# Patient Record
Sex: FEMALE | Race: WHITE | NOT HISPANIC OR LATINO | Employment: OTHER | URBAN - NONMETROPOLITAN AREA
[De-identification: names, ages, dates, MRNs, and addresses within clinical notes are randomized per-mention and may not be internally consistent; named-entity substitution may affect disease eponyms.]

---

## 2017-01-03 DIAGNOSIS — E78.49 OTHER HYPERLIPIDEMIA: Primary | ICD-10-CM

## 2017-01-03 DIAGNOSIS — E03.8 OTHER SPECIFIED HYPOTHYROIDISM: ICD-10-CM

## 2017-01-05 ENCOUNTER — OFFICE VISIT (OUTPATIENT)
Dept: CARDIOLOGY | Facility: CLINIC | Age: 72
End: 2017-01-05

## 2017-01-05 VITALS
HEART RATE: 72 BPM | HEIGHT: 62 IN | BODY MASS INDEX: 29.81 KG/M2 | SYSTOLIC BLOOD PRESSURE: 132 MMHG | WEIGHT: 162 LBS | DIASTOLIC BLOOD PRESSURE: 88 MMHG

## 2017-01-05 DIAGNOSIS — I10 ESSENTIAL HYPERTENSION: ICD-10-CM

## 2017-01-05 DIAGNOSIS — I25.9 ISCHEMIC HEART DISEASE: Primary | ICD-10-CM

## 2017-01-05 DIAGNOSIS — F17.210 CIGARETTE SMOKER: ICD-10-CM

## 2017-01-05 DIAGNOSIS — I34.0 NON-RHEUMATIC MITRAL REGURGITATION: ICD-10-CM

## 2017-01-05 DIAGNOSIS — E78.00 PURE HYPERCHOLESTEROLEMIA: ICD-10-CM

## 2017-01-05 PROCEDURE — 99213 OFFICE O/P EST LOW 20 MIN: CPT | Performed by: NURSE PRACTITIONER

## 2017-01-05 RX ORDER — METOPROLOL SUCCINATE 50 MG/1
50 TABLET, EXTENDED RELEASE ORAL DAILY
COMMUNITY
End: 2017-01-06 | Stop reason: SDUPTHER

## 2017-01-05 RX ORDER — VITAMIN E 268 MG
400 CAPSULE ORAL DAILY
COMMUNITY

## 2017-01-05 RX ORDER — CHOLECALCIFEROL (VITAMIN D3) 125 MCG
500 CAPSULE ORAL DAILY
COMMUNITY

## 2017-01-05 RX ORDER — RANITIDINE 300 MG/1
300 TABLET ORAL NIGHTLY
COMMUNITY
End: 2018-01-11 | Stop reason: SDUPTHER

## 2017-01-05 RX ORDER — CLOPIDOGREL BISULFATE 75 MG/1
75 TABLET ORAL DAILY
COMMUNITY
End: 2017-01-06 | Stop reason: SDUPTHER

## 2017-01-05 RX ORDER — LISINOPRIL 10 MG/1
10 TABLET ORAL 2 TIMES DAILY
COMMUNITY
End: 2017-01-06 | Stop reason: SDUPTHER

## 2017-01-05 RX ORDER — HYDROCHLOROTHIAZIDE 25 MG/1
25 TABLET ORAL DAILY
COMMUNITY
End: 2017-01-05

## 2017-01-05 RX ORDER — RANOLAZINE 500 MG/1
500 TABLET, EXTENDED RELEASE ORAL 2 TIMES DAILY
COMMUNITY
End: 2017-01-06 | Stop reason: SDUPTHER

## 2017-01-05 NOTE — PROGRESS NOTES
Chief Complaint   Patient presents with   • Follow-up     Denies chest pain, shortness of breath, or palpitations. Needs refills on cardiac meds for 90 days to [facility] pharmacy. Labs from yesterday in patient chart.        Subjective       Nicki Vela is a 71 y.o. female with a history of ischemic heart disease first diagnosed many years ago with stenting. Unfortunately, she has continued to smoke. Most recent cardiac testing showed only an infarct. Since the septum was still well perfused, medical management was encouraged.  Today she comes to the office for a follow-up appointment and no new or worsening symptoms are reported.    HPI         Cardiac History:    Past Surgical History   Procedure Laterality Date   • Hysterectomy Bilateral      14 lb cyst   • Cholecystectomy     • Cath lab procedure  06/1999     Acute Inferior Wall MI. Cath and Stent of RCA   • Cath lab procedure  11/13/2002     Cath- Mild In-Stent Stenosis.   • Echo - converted  06/27/2007     Echo- EF 44%. Inferoseptal WMA   • Cardiovascular stress test  07/11/2007     Stress- 6 min, Inferior Infarct with Arlyn-Infarct Ischemia.   • Echo - converted  07/18/2011     Echo- EF>40%   • Cardiovascular stress test  07/18/2011     Stress- 6 min, 76% THR. 134/80. Inferior Infarct with PeriInfarct Ischemia   • Echo - converted  12/16/2015     EF 45-50%m mild MR, lateral WMA   • Cardiovascular stress test  12/16/2015     EF 51%, inferolateral wall infarct, LV function lower end normal, continue med management       Current Outpatient Prescriptions   Medication Sig Dispense Refill   • atorvastatin (LIPITOR) 10 MG tablet Take 1 tablet by mouth Daily for 90 days. 90 tablet 3   • cetirizine (ZyrTEC) 10 MG tablet Take 10 mg by mouth daily.     • clopidogrel (PLAVIX) 75 MG tablet Take 1 tablet by mouth Daily for 90 days. 90 tablet 3   • escitalopram (LEXAPRO) 20 MG tablet Take 20 mg by mouth daily.     • levothyroxine (SYNTHROID) 112 MCG tablet Take 112 mcg  by mouth daily.     • lisinopril (PRINIVIL,ZESTRIL) 10 MG tablet Take 1 tablet by mouth 2 (Two) Times a Day for 90 days. 180 tablet 3   • metoprolol succinate XL (TOPROL-XL) 50 MG 24 hr tablet Take 1 tablet by mouth Daily for 90 days. 90 tablet 3   • raNITIdine (ZANTAC) 300 MG tablet Take 300 mg by mouth Every Night.     • ranolazine (RANEXA) 500 MG 12 hr tablet Take 1 tablet by mouth 2 (Two) Times a Day for 90 days. 180 tablet 3   • vitamin B-12 (CYANOCOBALAMIN) 500 MCG tablet Take 500 mcg by mouth Daily.     • vitamin E 400 UNIT capsule Take 400 Units by mouth Daily.       No current facility-administered medications for this visit.        Codeine and Tylenol with codeine #3 [acetaminophen-codeine]    Past Medical History   Diagnosis Date   • HTN (hypertension)    • Hx of cholecystectomy    • Hypercholesteremia    • Hyperlipidemia    • IHD (ischemic heart disease)      s/p stenting   • S/P complete hysterectomy      14 lb. cyst       Social History     Social History   • Marital status:      Spouse name: N/A   • Number of children: N/A   • Years of education: N/A     Occupational History   • Not on file.     Social History Main Topics   • Smoking status: Current Every Day Smoker     Packs/day: 0.50     Types: Cigarettes   • Smokeless tobacco: Never Used   • Alcohol use No   • Drug use: No   • Sexual activity: Not on file     Other Topics Concern   • Not on file     Social History Narrative       Family History   Problem Relation Age of Onset   • Hypertension Mother    • Diabetes Mother    • Heart disease Father    • Diabetes Other    • Heart disease Other    • Hypertension Other        Review of Systems   Constitutional: Negative for activity change and appetite change.   HENT: Negative for congestion, sinus pressure, trouble swallowing and voice change.    Respiratory: Positive for cough and shortness of breath (only with exertion). Negative for chest tightness.    Cardiovascular: Negative for chest pain,  "palpitations and leg swelling.   Gastrointestinal: Negative for abdominal distention, abdominal pain and blood in stool.   Endocrine: Negative for polydipsia, polyphagia and polyuria.   Genitourinary: Negative for dysuria and hematuria.   Musculoskeletal: Negative for gait problem and myalgias.   Neurological: Negative for dizziness, syncope, facial asymmetry and speech difficulty.   Psychiatric/Behavioral: Negative for confusion and sleep disturbance.       Diabetes- No  Thyroid-normal    Objective     Visit Vitals   • /88   • Pulse 72   • Ht 61.5\" (156.2 cm)   • Wt 162 lb (73.5 kg)   • BMI 30.11 kg/m2       Physical Exam   Constitutional: She is oriented to person, place, and time. Vital signs are normal. She appears well-developed.   Eyes: Pupils are equal, round, and reactive to light.   Neck: Neck supple. Carotid bruit is not present.   Cardiovascular: Normal rate and regular rhythm.    Pulmonary/Chest: Effort normal and breath sounds normal.   Abdominal: Soft. Bowel sounds are normal.   Neurological: She is alert and oriented to person, place, and time.   Skin: Skin is warm.   Psychiatric: She has a normal mood and affect. Her behavior is normal. Judgment normal.   Vitals reviewed.    Procedures        Assessment/Plan      Nicki was seen today for follow-up.    Diagnoses and all orders for this visit:    Ischemic heart disease    Essential hypertension    Pure hypercholesterolemia    Cigarette smoker    Non-rheumatic mitral regurgitation    Other orders  -     atorvastatin (LIPITOR) 10 MG tablet; Take 1 tablet by mouth Daily for 90 days.  -     clopidogrel (PLAVIX) 75 MG tablet; Take 1 tablet by mouth Daily for 90 days.  -     lisinopril (PRINIVIL,ZESTRIL) 10 MG tablet; Take 1 tablet by mouth 2 (Two) Times a Day for 90 days.  -     metoprolol succinate XL (TOPROL-XL) 50 MG 24 hr tablet; Take 1 tablet by mouth Daily for 90 days.  -     ranolazine (RANEXA) 500 MG 12 hr tablet; Take 1 tablet by mouth 2 " (Two) Times a Day for 90 days.      Recent lab report shows lipids are controlled and TSH is in normal range.  Her vital signs are stable and she remains asymptomatic.  No changes made to her cardiac medications and refills were faxed to her pharmacy.  I encouraged her on smoking cessation.  No further cardiac testing ordered at this time.  Should problems develop she understands to call.  Otherwise, we will see her back in 6 months.             Electronically signed by JESSICA Keita,  January 6, 2017 12:41 PM

## 2017-01-05 NOTE — LETTER
January 6, 2017     JESSICA Villarreal  [address] [facility] [address]    Patient: Nicki Vela   YOB: 1945   Date of Visit: 1/5/2017       Dear JESSICA Roach:    Nicki Vela was in my office today. Below is a copy of my note.    If you have questions, please do not hesitate to call me. I look forward to following Nicki along with you.         Sincerely,        JESSICA Keita        CC: No Recipients    Chief Complaint   Patient presents with   • Follow-up     Denies chest pain, shortness of breath, or palpitations. Needs refills on cardiac meds for 90 days to [facility] pharmacy. Labs from yesterday in patient chart.        Subjective       Nicki Vela is a 71 y.o. female with a history of ischemic heart disease first diagnosed many years ago with stenting. Unfortunately, she has continued to smoke. Most recent cardiac testing showed only an infarct. Since the septum was still well perfused, medical management was encouraged.  Today she comes to the office for a follow-up appointment and no new or worsening symptoms are reported.    HPI         Cardiac History:    Past Surgical History   Procedure Laterality Date   • Hysterectomy Bilateral      14 lb cyst   • Cholecystectomy     • Cath lab procedure  06/1999     Acute Inferior Wall MI. Cath and Stent of RCA   • Cath lab procedure  11/13/2002     Cath- Mild In-Stent Stenosis.   • Echo - converted  06/27/2007     Echo- EF 44%. Inferoseptal WMA   • Cardiovascular stress test  07/11/2007     Stress- 6 min, Inferior Infarct with Arlyn-Infarct Ischemia.   • Echo - converted  07/18/2011     Echo- EF>40%   • Cardiovascular stress test  07/18/2011     Stress- 6 min, 76% THR. 134/80. Inferior Infarct with PeriInfarct Ischemia   • Echo - converted  12/16/2015     EF 45-50%m mild MR, lateral WMA   • Cardiovascular stress test  12/16/2015     EF 51%, inferolateral wall infarct, LV function lower end normal, continue med management       Current  Outpatient Prescriptions   Medication Sig Dispense Refill   • atorvastatin (LIPITOR) 10 MG tablet Take 1 tablet by mouth Daily for 90 days. 90 tablet 3   • cetirizine (ZyrTEC) 10 MG tablet Take 10 mg by mouth daily.     • clopidogrel (PLAVIX) 75 MG tablet Take 1 tablet by mouth Daily for 90 days. 90 tablet 3   • escitalopram (LEXAPRO) 20 MG tablet Take 20 mg by mouth daily.     • levothyroxine (SYNTHROID) 112 MCG tablet Take 112 mcg by mouth daily.     • lisinopril (PRINIVIL,ZESTRIL) 10 MG tablet Take 1 tablet by mouth 2 (Two) Times a Day for 90 days. 180 tablet 3   • metoprolol succinate XL (TOPROL-XL) 50 MG 24 hr tablet Take 1 tablet by mouth Daily for 90 days. 90 tablet 3   • raNITIdine (ZANTAC) 300 MG tablet Take 300 mg by mouth Every Night.     • ranolazine (RANEXA) 500 MG 12 hr tablet Take 1 tablet by mouth 2 (Two) Times a Day for 90 days. 180 tablet 3   • vitamin B-12 (CYANOCOBALAMIN) 500 MCG tablet Take 500 mcg by mouth Daily.     • vitamin E 400 UNIT capsule Take 400 Units by mouth Daily.       No current facility-administered medications for this visit.        Codeine and Tylenol with codeine #3 [acetaminophen-codeine]    Past Medical History   Diagnosis Date   • HTN (hypertension)    • Hx of cholecystectomy    • Hypercholesteremia    • Hyperlipidemia    • IHD (ischemic heart disease)      s/p stenting   • S/P complete hysterectomy      14 lb. cyst       Social History     Social History   • Marital status:      Spouse name: N/A   • Number of children: N/A   • Years of education: N/A     Occupational History   • Not on file.     Social History Main Topics   • Smoking status: Current Every Day Smoker     Packs/day: 0.50     Types: Cigarettes   • Smokeless tobacco: Never Used   • Alcohol use No   • Drug use: No   • Sexual activity: Not on file     Other Topics Concern   • Not on file     Social History Narrative       Family History   Problem Relation Age of Onset   • Hypertension Mother    • Diabetes  "Mother    • Heart disease Father    • Diabetes Other    • Heart disease Other    • Hypertension Other        Review of Systems   Constitutional: Negative for activity change and appetite change.   HENT: Negative for congestion, sinus pressure, trouble swallowing and voice change.    Respiratory: Positive for cough and shortness of breath (only with exertion). Negative for chest tightness.    Cardiovascular: Negative for chest pain, palpitations and leg swelling.   Gastrointestinal: Negative for abdominal distention, abdominal pain and blood in stool.   Endocrine: Negative for polydipsia, polyphagia and polyuria.   Genitourinary: Negative for dysuria and hematuria.   Musculoskeletal: Negative for gait problem and myalgias.   Neurological: Negative for dizziness, syncope, facial asymmetry and speech difficulty.   Psychiatric/Behavioral: Negative for confusion and sleep disturbance.       Diabetes- No  Thyroid-normal    Objective     Visit Vitals   • /88   • Pulse 72   • Ht 61.5\" (156.2 cm)   • Wt 162 lb (73.5 kg)   • BMI 30.11 kg/m2       Physical Exam   Constitutional: She is oriented to person, place, and time. Vital signs are normal. She appears well-developed.   Eyes: Pupils are equal, round, and reactive to light.   Neck: Neck supple. Carotid bruit is not present.   Cardiovascular: Normal rate and regular rhythm.    Pulmonary/Chest: Effort normal and breath sounds normal.   Abdominal: Soft. Bowel sounds are normal.   Neurological: She is alert and oriented to person, place, and time.   Skin: Skin is warm.   Psychiatric: She has a normal mood and affect. Her behavior is normal. Judgment normal.   Vitals reviewed.    Procedures        Assessment/Plan      Nicki was seen today for follow-up.    Diagnoses and all orders for this visit:    Ischemic heart disease    Essential hypertension    Pure hypercholesterolemia    Cigarette smoker    Non-rheumatic mitral regurgitation    Other orders  -     atorvastatin " (LIPITOR) 10 MG tablet; Take 1 tablet by mouth Daily for 90 days.  -     clopidogrel (PLAVIX) 75 MG tablet; Take 1 tablet by mouth Daily for 90 days.  -     lisinopril (PRINIVIL,ZESTRIL) 10 MG tablet; Take 1 tablet by mouth 2 (Two) Times a Day for 90 days.  -     metoprolol succinate XL (TOPROL-XL) 50 MG 24 hr tablet; Take 1 tablet by mouth Daily for 90 days.  -     ranolazine (RANEXA) 500 MG 12 hr tablet; Take 1 tablet by mouth 2 (Two) Times a Day for 90 days.      Recent lab report shows lipids are controlled and TSH is in normal range.  Her vital signs are stable and she remains asymptomatic.  No changes made to her cardiac medications and refills were faxed to her pharmacy.  I encouraged her on smoking cessation.  No further cardiac testing ordered at this time.  Should problems develop she understands to call.  Otherwise, we will see her back in 6 months.             Electronically signed by JESSICA Keita,  January 6, 2017 12:41 PM

## 2017-01-05 NOTE — MR AVS SNAPSHOT
Nicki Vela   1/5/2017 12:30 PM   Office Visit    Dept Phone:  [phone]   Encounter #:  [id number]    Provider:  JESSICA Kirkland   Department:  [facility] CARDIOLOGY                Your Full Care Plan              Today's Medication Changes          These changes are accurate as of: 1/5/17  1:30 PM.  If you have any questions, ask your nurse or doctor.               Stop taking medication(s)listed here:     hydrochlorothiazide 25 MG tablet   Commonly known as:  HYDRODIURIL   Stopped by:  JESSICA Kirkland                      Your Updated Medication List          This list is accurate as of: 1/5/17  1:30 PM.  Always use your most recent med list.                atorvastatin 10 MG tablet   Commonly known as:  LIPITOR   Take 1 tablet by mouth daily.       cetirizine 10 MG tablet   Commonly known as:  zyrTEC       clopidogrel 75 MG tablet   Commonly known as:  PLAVIX       escitalopram 20 MG tablet   Commonly known as:  LEXAPRO       lisinopril 10 MG tablet   Commonly known as:  PRINIVIL,ZESTRIL       metoprolol succinate XL 50 MG 24 hr tablet   Commonly known as:  TOPROL-XL       raNITIdine 300 MG tablet   Commonly known as:  ZANTAC       ranolazine 500 MG 12 hr tablet   Commonly known as:  RANEXA       SYNTHROID 112 MCG tablet   Generic drug:  levothyroxine       vitamin B-12 500 MCG tablet   Commonly known as:  CYANOCOBALAMIN       vitamin E 400 UNIT capsule               You Were Diagnosed With        Codes Comments    Ischemic heart disease    -  Primary ICD-10-CM: I25.9  ICD-9-CM: 414.9     Essential hypertension     ICD-10-CM: I10  ICD-9-CM: 401.9     Pure hypercholesterolemia     ICD-10-CM: E78.00  ICD-9-CM: 272.0     Cigarette smoker     ICD-10-CM: F17.210  ICD-9-CM: 305.1       Instructions     None    Patient Instructions History      Upcoming Appointments     Visit Type Date Time Department    FOLLOW UP 1/5/2017 12:30 PM [facility]    FOLLOW UP  "7/10/2017  2:30 PM [facility]      [facility] Signup     [facility] [employer] allows you to send messages to your doctor, view your test results, renew your prescriptions, schedule appointments, and more. To sign up, go to [employer] and click on the Sign Up Now link in the New User? box. Enter your [employer] Activation Code exactly as it appears below along with the last four digits of your Social Security Number and your Date of Birth () to complete the sign-up process. If you do not sign up before the expiration date, you must request a new code.    [employer] Activation Code: [email or web address]  Expires: 2017  1:29 PM    If you have questions, you can email [email or web address] or call [phone] to talk to our [employer] staff. Remember, [employer] is NOT to be used for urgent needs. For medical emergencies, dial 911.               Other Info from Your Visit           Your Appointments     Jul 10, 2017  2:30 PM EDT   Follow Up with JESSICA Leon   [facility] (--)    [address] [place] [address] [phone]           Arrive 15 minutes prior to appointment.              Allergies     Codeine      Tylenol With Codeine #3 [Acetaminophen-codeine]        Reason for Visit     Follow-up Denies chest pain, shortness of breath, or palpitations. Needs refills on cardiac meds for 90 days to [facility] pharmacy. Labs from yesterday in patient chart.       Vital Signs     Blood Pressure Pulse Height Weight Body Mass Index Smoking Status    132/88 72 61.5\" (156.2 cm) 162 lb (73.5 kg) 30.11 kg/m2 Current Every Day Smoker      Problems and Diagnoses Noted     Cigarette smoker    High cholesterol or triglycerides    High blood pressure    Ischemic heart disease        "

## 2017-01-06 PROBLEM — I34.0 NON-RHEUMATIC MITRAL REGURGITATION: Status: ACTIVE | Noted: 2017-01-06

## 2017-01-06 RX ORDER — ATORVASTATIN CALCIUM 10 MG/1
10 TABLET, FILM COATED ORAL DAILY
Qty: 90 TABLET | Refills: 3 | Status: SHIPPED | OUTPATIENT
Start: 2017-01-06 | End: 2017-04-06

## 2017-01-06 RX ORDER — METOPROLOL SUCCINATE 50 MG/1
50 TABLET, EXTENDED RELEASE ORAL DAILY
Qty: 90 TABLET | Refills: 3 | Status: SHIPPED | OUTPATIENT
Start: 2017-01-06 | End: 2017-04-06

## 2017-01-06 RX ORDER — RANOLAZINE 500 MG/1
500 TABLET, EXTENDED RELEASE ORAL 2 TIMES DAILY
Qty: 180 TABLET | Refills: 3 | Status: SHIPPED | OUTPATIENT
Start: 2017-01-06 | End: 2017-04-06

## 2017-01-06 RX ORDER — CLOPIDOGREL BISULFATE 75 MG/1
75 TABLET ORAL DAILY
Qty: 90 TABLET | Refills: 3 | Status: SHIPPED | OUTPATIENT
Start: 2017-01-06 | End: 2017-04-06

## 2017-01-06 RX ORDER — LISINOPRIL 10 MG/1
10 TABLET ORAL 2 TIMES DAILY
Qty: 180 TABLET | Refills: 3 | Status: SHIPPED | OUTPATIENT
Start: 2017-01-06 | End: 2017-04-06

## 2017-02-09 RX ORDER — ESCITALOPRAM OXALATE 20 MG/1
TABLET ORAL
Qty: 90 TABLET | Refills: 2 | OUTPATIENT
Start: 2017-02-09

## 2017-02-13 RX ORDER — ESCITALOPRAM OXALATE 20 MG/1
TABLET ORAL
Qty: 90 TABLET | Refills: 2 | OUTPATIENT
Start: 2017-02-13

## 2017-07-10 ENCOUNTER — OFFICE VISIT (OUTPATIENT)
Dept: CARDIOLOGY | Facility: CLINIC | Age: 72
End: 2017-07-10

## 2017-07-10 VITALS
HEIGHT: 62 IN | WEIGHT: 162 LBS | SYSTOLIC BLOOD PRESSURE: 120 MMHG | HEART RATE: 72 BPM | DIASTOLIC BLOOD PRESSURE: 82 MMHG | BODY MASS INDEX: 29.81 KG/M2

## 2017-07-10 DIAGNOSIS — I25.9 ISCHEMIC HEART DISEASE: Primary | ICD-10-CM

## 2017-07-10 DIAGNOSIS — I10 ESSENTIAL HYPERTENSION: ICD-10-CM

## 2017-07-10 DIAGNOSIS — I34.0 NON-RHEUMATIC MITRAL REGURGITATION: ICD-10-CM

## 2017-07-10 DIAGNOSIS — E78.49 OTHER HYPERLIPIDEMIA: ICD-10-CM

## 2017-07-10 DIAGNOSIS — F17.210 CIGARETTE SMOKER: ICD-10-CM

## 2017-07-10 PROCEDURE — 99214 OFFICE O/P EST MOD 30 MIN: CPT | Performed by: NURSE PRACTITIONER

## 2017-07-10 PROCEDURE — 99406 BEHAV CHNG SMOKING 3-10 MIN: CPT | Performed by: NURSE PRACTITIONER

## 2017-07-10 RX ORDER — ATORVASTATIN CALCIUM 10 MG/1
10 TABLET, FILM COATED ORAL DAILY
Qty: 90 TABLET | Refills: 2 | Status: SHIPPED | OUTPATIENT
Start: 2017-07-10 | End: 2018-01-11 | Stop reason: SDUPTHER

## 2017-07-10 RX ORDER — CLOPIDOGREL BISULFATE 75 MG/1
75 TABLET ORAL DAILY
COMMUNITY
End: 2017-07-10 | Stop reason: SDUPTHER

## 2017-07-10 RX ORDER — LISINOPRIL 10 MG/1
10 TABLET ORAL 2 TIMES DAILY
COMMUNITY
End: 2017-07-10 | Stop reason: SDUPTHER

## 2017-07-10 RX ORDER — RANOLAZINE 500 MG/1
500 TABLET, EXTENDED RELEASE ORAL 2 TIMES DAILY
COMMUNITY
End: 2017-07-10 | Stop reason: SDUPTHER

## 2017-07-10 RX ORDER — METOPROLOL SUCCINATE 50 MG/1
50 TABLET, EXTENDED RELEASE ORAL DAILY
Qty: 90 TABLET | Refills: 2 | Status: SHIPPED | OUTPATIENT
Start: 2017-07-10 | End: 2018-01-11 | Stop reason: SDUPTHER

## 2017-07-10 RX ORDER — ATORVASTATIN CALCIUM 10 MG/1
10 TABLET, FILM COATED ORAL DAILY
COMMUNITY
End: 2017-07-10 | Stop reason: SDUPTHER

## 2017-07-10 RX ORDER — METOPROLOL SUCCINATE 50 MG/1
50 TABLET, EXTENDED RELEASE ORAL DAILY
COMMUNITY
End: 2017-07-10 | Stop reason: SDUPTHER

## 2017-07-10 RX ORDER — LISINOPRIL 10 MG/1
10 TABLET ORAL 2 TIMES DAILY
Qty: 180 TABLET | Refills: 2 | Status: SHIPPED | OUTPATIENT
Start: 2017-07-10 | End: 2018-01-11 | Stop reason: SDUPTHER

## 2017-07-10 RX ORDER — RANOLAZINE 500 MG/1
500 TABLET, EXTENDED RELEASE ORAL 2 TIMES DAILY
Qty: 180 TABLET | Refills: 2 | Status: SHIPPED | OUTPATIENT
Start: 2017-07-10 | End: 2018-01-11 | Stop reason: SDUPTHER

## 2017-07-10 RX ORDER — CLOPIDOGREL BISULFATE 75 MG/1
75 TABLET ORAL DAILY
Qty: 90 TABLET | Refills: 2 | Status: SHIPPED | OUTPATIENT
Start: 2017-07-10 | End: 2018-01-11 | Stop reason: SDUPTHER

## 2017-07-10 NOTE — PATIENT INSTRUCTIONS
Steps to Quit Smoking   Smoking tobacco can be harmful to your health and can affect almost every organ in your body. Smoking puts you, and those around you, at risk for developing many serious chronic diseases. Quitting smoking is difficult, but it is one of the best things that you can do for your health. It is never too late to quit.  WHAT ARE THE BENEFITS OF QUITTING SMOKING?  When you quit smoking, you lower your risk of developing serious diseases and conditions, such as:  · Lung cancer or lung disease, such as COPD.  · Heart disease.  · Stroke.  · Heart attack.  · Infertility.  · Osteoporosis and bone fractures.  Additionally, symptoms such as coughing, wheezing, and shortness of breath may get better when you quit. You may also find that you get sick less often because your body is stronger at fighting off colds and infections. If you are pregnant, quitting smoking can help to reduce your chances of having a baby of low birth weight.  HOW DO I GET READY TO QUIT?  When you decide to quit smoking, create a plan to make sure that you are successful. Before you quit:  · Pick a date to quit. Set a date within the next two weeks to give you time to prepare.  · Write down the reasons why you are quitting. Keep this list in places where you will see it often, such as on your bathroom mirror or in your car or wallet.  · Identify the people, places, things, and activities that make you want to smoke (triggers) and avoid them. Make sure to take these actions:    Throw away all cigarettes at home, at work, and in your car.    Throw away smoking accessories, such as ashtrays and lighters.    Clean your car and make sure to empty the ashtray.    Clean your home, including curtains and carpets.  · Tell your family, friends, and coworkers that you are quitting. Support from your loved ones can make quitting easier.  · Talk with your health care provider about your options for quitting smoking.  · Find out what treatment  "options are covered by your health insurance.  WHAT STRATEGIES CAN I USE TO QUIT SMOKING?   Talk with your healthcare provider about different strategies to quit smoking. Some strategies include:  · Quitting smoking altogether instead of gradually lessening how much you smoke over a period of time. Research shows that quitting \"cold turkey\" is more successful than gradually quitting.  · Attending in-person counseling to help you build problem-solving skills. You are more likely to have success in quitting if you attend several counseling sessions. Even short sessions of 10 minutes can be effective.  · Finding resources and support systems that can help you to quit smoking and remain smoke-free after you quit. These resources are most helpful when you use them often. They can include:    Online chats with a counselor.    Telephone quitlines.    Printed self-help materials.    Support groups or group counseling.    Text messaging programs.    Mobile phone applications.  · Taking medicines to help you quit smoking. (If you are pregnant or breastfeeding, talk with your health care provider first.) Some medicines contain nicotine and some do not. Both types of medicines help with cravings, but the medicines that include nicotine help to relieve withdrawal symptoms. Your health care provider may recommend:    Nicotine patches, gum, or lozenges.    Nicotine inhalers or sprays.    Non-nicotine medicine that is taken by mouth.  Talk with your health care provider about combining strategies, such as taking medicines while you are also receiving in-person counseling. Using these two strategies together makes you more likely to succeed in quitting than if you used either strategy on its own.  If you are pregnant or breastfeeding, talk with your health care provider about finding counseling or other support strategies to quit smoking. Do not take medicine to help you quit smoking unless told to do so by your health care " provider.  WHAT THINGS CAN I DO TO MAKE IT EASIER TO QUIT?  Quitting smoking might feel overwhelming at first, but there is a lot that you can do to make it easier. Take these important actions:  · Reach out to your family and friends and ask that they support and encourage you during this time. Call telephone quitlines, reach out to support groups, or work with a counselor for support.  · Ask people who smoke to avoid smoking around you.  · Avoid places that trigger you to smoke, such as bars, parties, or smoke-break areas at work.  · Spend time around people who do not smoke.  · Lessen stress in your life, because stress can be a smoking trigger for some people. To lessen stress, try:    Exercising regularly.    Deep-breathing exercises.    Yoga.    Meditating.    Performing a body scan. This involves closing your eyes, scanning your body from head to toe, and noticing which parts of your body are particularly tense. Purposefully relax the muscles in those areas.  · Download or purchase mobile phone or tablet apps (applications) that can help you stick to your quit plan by providing reminders, tips, and encouragement. There are many free apps, such as QuitGuide from the CDC (Centers for Disease Control and Prevention). You can find other support for quitting smoking (smoking cessation) through smokefree.gov and other websites.  HOW WILL I FEEL WHEN I QUIT SMOKING?  Within the first 24 hours of quitting smoking, you may start to feel some withdrawal symptoms. These symptoms are usually most noticeable 2-3 days after quitting, but they usually do not last beyond 2-3 weeks. Changes or symptoms that you might experience include:  · Mood swings.  · Restlessness, anxiety, or irritation.  · Difficulty concentrating.  · Dizziness.  · Strong cravings for sugary foods in addition to nicotine.  · Mild weight gain.  · Constipation.  · Nausea.  · Coughing or a sore throat.  · Changes in how your medicines work in your  body.  · A depressed mood.  · Difficulty sleeping (insomnia).  After the first 2-3 weeks of quitting, you may start to notice more positive results, such as:  · Improved sense of smell and taste.  · Decreased coughing and sore throat.  · Slower heart rate.  · Lower blood pressure.  · Clearer skin.  · The ability to breathe more easily.  · Fewer sick days.  Quitting smoking is very challenging for most people. Do not get discouraged if you are not successful the first time. Some people need to make many attempts to quit before they achieve long-term success. Do your best to stick to your quit plan, and talk with your health care provider if you have any questions or concerns.     This information is not intended to replace advice given to you by your health care provider. Make sure you discuss any questions you have with your health care provider.     Document Released: 12/12/2002 Document Revised: 05/03/2016 Document Reviewed: 05/03/2016  [employer] Patient Education ©2017 [employer]

## 2017-07-10 NOTE — PROGRESS NOTES
Chief Complaint   Patient presents with   • Follow-up     Patient denies chest pains. Patient says some times she gets palpitations if she gets tired. Denies shortness of breath. Last lab work in January 2017 in chart.    • Med Refill     Needs refills on cardiac meds. 90 day supply to [facility].        Cardiac Complaints  palpitations      Subjective   Nicki Vela is a 72 y.o. female with a history of ischemic heart disease first diagnosed many years ago with stenting.  Most recent cardiac testing in 2015 showed only an infarct. Since the septum was still well perfused, medical management was encouraged. She returns today for follow up and denies any new cardiac concerns.  She reports rare palpitations if she is fatigued but denies any significant chest pain or shortness of breath. She states she has not had any recent labs but will be following with PCP in regards soon.  Cardiac refills are requested. Unfortunately, she has continued to smoke but has cut back to about 1/2 pack a day.         Cardiac History  Past Surgical History:   Procedure Laterality Date   • CARDIOVASCULAR STRESS TEST  07/11/2007    Stress- 6 min, Inferior Infarct with Arlyn-Infarct Ischemia.   • CARDIOVASCULAR STRESS TEST  07/18/2011    Stress- 6 min, 76% THR. 134/80. Inferior Infarct with PeriInfarct Ischemia   • CARDIOVASCULAR STRESS TEST  12/16/2015    EF 51%, inferolateral wall infarct, LV function lower end normal, continue med management   • CATH LAB PROCEDURE  06/1999    Acute Inferior Wall MI. Cath and Stent of RCA   • CATH LAB PROCEDURE  11/13/2002    Cath- Mild In-Stent Stenosis.   • CHOLECYSTECTOMY     • ECHO - CONVERTED  06/27/2007    Echo- EF 44%. Inferoseptal WMA   • ECHO - CONVERTED  07/18/2011    Echo- EF>40%   • ECHO - CONVERTED  12/16/2015    EF 45-50%m mild MR, lateral WMA   • HYSTERECTOMY Bilateral     14 lb cyst       Current Outpatient Prescriptions   Medication Sig Dispense Refill   • atorvastatin (LIPITOR)  10 MG tablet Take 1 tablet by mouth Daily. 90 tablet 2   • cetirizine (ZyrTEC) 10 MG tablet Take 10 mg by mouth daily.     • clopidogrel (PLAVIX) 75 MG tablet Take 1 tablet by mouth Daily. 90 tablet 2   • escitalopram (LEXAPRO) 20 MG tablet Take 20 mg by mouth daily.     • levothyroxine (SYNTHROID) 112 MCG tablet Take 112 mcg by mouth daily.     • lisinopril (PRINIVIL,ZESTRIL) 10 MG tablet Take 1 tablet by mouth 2 (Two) Times a Day. 180 tablet 2   • metoprolol succinate XL (TOPROL-XL) 50 MG 24 hr tablet Take 1 tablet by mouth Daily. 90 tablet 2   • raNITIdine (ZANTAC) 300 MG tablet Take 300 mg by mouth Every Night.     • ranolazine (RANEXA) 500 MG 12 hr tablet Take 1 tablet by mouth 2 (Two) Times a Day. 180 tablet 2   • vitamin B-12 (CYANOCOBALAMIN) 500 MCG tablet Take 500 mcg by mouth Daily.     • vitamin E 400 UNIT capsule Take 400 Units by mouth Daily.       No current facility-administered medications for this visit.        Codeine and Tylenol with codeine #3 [acetaminophen-codeine]    Past Medical History:   Diagnosis Date   • HTN (hypertension)    • Hx of cholecystectomy    • Hypercholesteremia    • Hyperlipidemia    • IHD (ischemic heart disease)     s/p stenting   • S/P complete hysterectomy     14 lb. cyst       Social History     Social History   • Marital status:      Spouse name: N/A   • Number of children: N/A   • Years of education: N/A     Occupational History   • Not on file.     Social History Main Topics   • Smoking status: Current Every Day Smoker     Packs/day: 0.50     Types: Cigarettes   • Smokeless tobacco: Never Used   • Alcohol use No   • Drug use: No   • Sexual activity: Not on file     Other Topics Concern   • Not on file     Social History Narrative       Family History   Problem Relation Age of Onset   • Hypertension Mother    • Diabetes Mother    • Heart disease Father    • Diabetes Other    • Heart disease Other    • Hypertension Other        Review of Systems   Constitution:  "Negative for weakness and malaise/fatigue.   HENT: Negative for congestion and sore throat.    Cardiovascular: Positive for palpitations. Negative for chest pain, dyspnea on exertion, irregular heartbeat, leg swelling and near-syncope.   Respiratory: Negative for cough and sleep disturbances due to breathing.    Musculoskeletal: Negative for arthritis and back pain.   Gastrointestinal: Negative for anorexia, dysphagia, heartburn and nausea.   Genitourinary: Negative for dysuria, frequency, hematuria and hesitancy.   Neurological: Negative for dizziness, focal weakness and light-headedness.   Psychiatric/Behavioral: Negative for altered mental status and depression.       DiabetesNo  Thyroidabnormal    Objective     /82 (BP Location: Left arm)  Pulse 72  Ht 61.5\" (156.2 cm)  Wt 162 lb (73.5 kg)  BMI 30.11 kg/m2    Physical Exam   Constitutional: She is oriented to person, place, and time. She appears well-developed and well-nourished.   HENT:   Head: Normocephalic and atraumatic.   Eyes: EOM are normal. Pupils are equal, round, and reactive to light.   Neck: Normal range of motion. Neck supple.   Cardiovascular: Normal rate and regular rhythm.    Murmur heard.  Pulmonary/Chest: Effort normal and breath sounds normal.   Abdominal: Soft.   Musculoskeletal: Normal range of motion.   Neurological: She is alert and oriented to person, place, and time.   Skin: Skin is warm and dry.   Psychiatric: She has a normal mood and affect. Her behavior is normal.       Procedures    Assessment/Plan     HR and BP are stable.  We discussed possible repeat cardiac workup at next visit since length of time since last testing and abnormal stress in the past.  We will not advise on any repeat testing today as no new concerns are voiced.  Labs she reports with you, she reports she will have some again soon, could we get next copy?  Cardiac refills sent per request.  Smoking cessation advised once again, we discussed modalities " including patches but she declines any patches at present, over 3 minutes spent discussing, handout provided.  Good cardiac diet and activity as tolerated advised.  6 month follow up advised or sooner if needed.      Problems Addressed this Visit        Cardiovascular and Mediastinum    Hyperlipidemia    Relevant Medications    atorvastatin (LIPITOR) 10 MG tablet    Ischemic heart disease - Primary    Relevant Medications    metoprolol succinate XL (TOPROL-XL) 50 MG 24 hr tablet    ranolazine (RANEXA) 500 MG 12 hr tablet    clopidogrel (PLAVIX) 75 MG tablet    Hypertension    Relevant Medications    metoprolol succinate XL (TOPROL-XL) 50 MG 24 hr tablet    lisinopril (PRINIVIL,ZESTRIL) 10 MG tablet    Non-rheumatic mitral regurgitation    Relevant Medications    metoprolol succinate XL (TOPROL-XL) 50 MG 24 hr tablet    ranolazine (RANEXA) 500 MG 12 hr tablet    clopidogrel (PLAVIX) 75 MG tablet       Other    Cigarette smoker                  Electronically signed by JESSICA Polk July 10, 2017 4:16 PM

## 2018-01-11 ENCOUNTER — OFFICE VISIT (OUTPATIENT)
Dept: CARDIOLOGY | Facility: CLINIC | Age: 73
End: 2018-01-11

## 2018-01-11 VITALS
SYSTOLIC BLOOD PRESSURE: 120 MMHG | WEIGHT: 164 LBS | HEART RATE: 72 BPM | HEIGHT: 61 IN | DIASTOLIC BLOOD PRESSURE: 84 MMHG | BODY MASS INDEX: 30.96 KG/M2

## 2018-01-11 DIAGNOSIS — E78.49 OTHER HYPERLIPIDEMIA: ICD-10-CM

## 2018-01-11 DIAGNOSIS — I34.0 NON-RHEUMATIC MITRAL REGURGITATION: ICD-10-CM

## 2018-01-11 DIAGNOSIS — F17.210 CIGARETTE SMOKER: ICD-10-CM

## 2018-01-11 DIAGNOSIS — I10 ESSENTIAL HYPERTENSION: ICD-10-CM

## 2018-01-11 DIAGNOSIS — I25.9 ISCHEMIC HEART DISEASE: Primary | ICD-10-CM

## 2018-01-11 PROCEDURE — 99213 OFFICE O/P EST LOW 20 MIN: CPT | Performed by: NURSE PRACTITIONER

## 2018-01-11 RX ORDER — RANOLAZINE 500 MG/1
500 TABLET, EXTENDED RELEASE ORAL EVERY 12 HOURS SCHEDULED
Qty: 180 TABLET | Refills: 2 | Status: SHIPPED | OUTPATIENT
Start: 2018-01-11 | End: 2018-07-12 | Stop reason: SDUPTHER

## 2018-01-11 RX ORDER — METOPROLOL SUCCINATE 50 MG/1
50 TABLET, EXTENDED RELEASE ORAL DAILY
Qty: 90 TABLET | Refills: 2 | Status: SHIPPED | OUTPATIENT
Start: 2018-01-11 | End: 2018-07-12 | Stop reason: SDUPTHER

## 2018-01-11 RX ORDER — ATORVASTATIN CALCIUM 10 MG/1
10 TABLET, FILM COATED ORAL DAILY
Qty: 90 TABLET | Refills: 2 | Status: SHIPPED | OUTPATIENT
Start: 2018-01-11 | End: 2018-07-12 | Stop reason: SDUPTHER

## 2018-01-11 RX ORDER — CLOPIDOGREL BISULFATE 75 MG/1
75 TABLET ORAL DAILY
Qty: 90 TABLET | Refills: 2 | Status: SHIPPED | OUTPATIENT
Start: 2018-01-11 | End: 2018-07-12 | Stop reason: SDUPTHER

## 2018-01-11 RX ORDER — LISINOPRIL 10 MG/1
10 TABLET ORAL 2 TIMES DAILY
Qty: 180 TABLET | Refills: 2 | Status: SHIPPED | OUTPATIENT
Start: 2018-01-11 | End: 2018-07-12 | Stop reason: SDUPTHER

## 2018-01-11 RX ORDER — RANITIDINE 300 MG/1
300 TABLET ORAL NIGHTLY
Qty: 180 TABLET | Refills: 3 | Status: SHIPPED | OUTPATIENT
Start: 2018-01-11 | End: 2020-03-17 | Stop reason: SINTOL

## 2018-07-12 ENCOUNTER — OFFICE VISIT (OUTPATIENT)
Dept: CARDIOLOGY | Facility: CLINIC | Age: 73
End: 2018-07-12

## 2018-07-12 VITALS
WEIGHT: 162 LBS | HEIGHT: 61 IN | BODY MASS INDEX: 30.58 KG/M2 | DIASTOLIC BLOOD PRESSURE: 94 MMHG | HEART RATE: 60 BPM | SYSTOLIC BLOOD PRESSURE: 158 MMHG

## 2018-07-12 DIAGNOSIS — I25.9 ISCHEMIC HEART DISEASE: Primary | ICD-10-CM

## 2018-07-12 DIAGNOSIS — I34.0 NON-RHEUMATIC MITRAL REGURGITATION: ICD-10-CM

## 2018-07-12 DIAGNOSIS — E78.00 PURE HYPERCHOLESTEROLEMIA: ICD-10-CM

## 2018-07-12 DIAGNOSIS — E66.9 OBESITY (BMI 30.0-34.9): ICD-10-CM

## 2018-07-12 DIAGNOSIS — F17.210 CIGARETTE SMOKER: ICD-10-CM

## 2018-07-12 DIAGNOSIS — I10 ESSENTIAL HYPERTENSION: ICD-10-CM

## 2018-07-12 PROCEDURE — 99213 OFFICE O/P EST LOW 20 MIN: CPT | Performed by: NURSE PRACTITIONER

## 2018-07-12 RX ORDER — RANOLAZINE 500 MG/1
500 TABLET, EXTENDED RELEASE ORAL EVERY 12 HOURS SCHEDULED
Qty: 180 TABLET | Refills: 3 | Status: SHIPPED | OUTPATIENT
Start: 2018-07-12 | End: 2019-07-18 | Stop reason: SDUPTHER

## 2018-07-12 RX ORDER — CLOPIDOGREL BISULFATE 75 MG/1
75 TABLET ORAL DAILY
Qty: 90 TABLET | Refills: 3 | Status: SHIPPED | OUTPATIENT
Start: 2018-07-12 | End: 2019-07-18 | Stop reason: SDUPTHER

## 2018-07-12 RX ORDER — METOPROLOL SUCCINATE 50 MG/1
50 TABLET, EXTENDED RELEASE ORAL DAILY
Qty: 90 TABLET | Refills: 3 | Status: SHIPPED | OUTPATIENT
Start: 2018-07-12 | End: 2019-01-16 | Stop reason: SDUPTHER

## 2018-07-12 RX ORDER — LISINOPRIL 10 MG/1
10 TABLET ORAL 2 TIMES DAILY
Qty: 180 TABLET | Refills: 3 | Status: SHIPPED | OUTPATIENT
Start: 2018-07-12 | End: 2019-01-16

## 2018-07-12 RX ORDER — ATORVASTATIN CALCIUM 10 MG/1
10 TABLET, FILM COATED ORAL DAILY
Qty: 90 TABLET | Refills: 3 | Status: SHIPPED | OUTPATIENT
Start: 2018-07-12 | End: 2019-07-18 | Stop reason: SDUPTHER

## 2018-07-12 NOTE — PROGRESS NOTES
Chief Complaint   Patient presents with   • Follow-up     For cardiac management.    • Med Refill     refills needed on cardiac meds. 90 days to [facility].        Subjective       Nicki Vela is a 73 y.o. female with a history of ischemic heart disease first diagnosed many years ago with stenting. Unfortunately, she has continued to smoke. IN 2015, cardiac testing showed only an infarct. Since the septum was still well perfused, medical management was encouraged.  Today she comes to the office for a follow up visit and no cardiac complaints are voiced. She works in her garden and maintains her normal activities without issue. No recent medication changes reported.     HPI     Cardiac History:    Past Surgical History:   Procedure Laterality Date   • CARDIOVASCULAR STRESS TEST  07/11/2007    Stress- 6 min, Inferior Infarct with Arlyn-Infarct Ischemia.   • CARDIOVASCULAR STRESS TEST  07/18/2011    Stress- 6 min, 76% THR. 134/80. Inferior Infarct with PeriInfarct Ischemia   • CARDIOVASCULAR STRESS TEST  12/16/2015    EF 51%, inferolateral wall infarct, LV function lower end normal, continue med management   • CATH LAB PROCEDURE  06/1999    Acute Inferior Wall MI. Cath and Stent of RCA   • CATH LAB PROCEDURE  11/13/2002    Cath- Mild In-Stent Stenosis.   • CHOLECYSTECTOMY     • ECHO - CONVERTED  06/27/2007    Echo- EF 44%. Inferoseptal WMA   • ECHO - CONVERTED  07/18/2011    Echo- EF>40%   • ECHO - CONVERTED  12/16/2015    EF 45-50%m mild MR, lateral WMA   • HYSTERECTOMY Bilateral     14 lb cyst       Current Outpatient Prescriptions   Medication Sig Dispense Refill   • atorvastatin (LIPITOR) 10 MG tablet Take 1 tablet by mouth Daily. 90 tablet 3   • cetirizine (ZyrTEC) 10 MG tablet Take 10 mg by mouth daily.     • clopidogrel (PLAVIX) 75 MG tablet Take 1 tablet by mouth Daily. 90 tablet 3   • escitalopram (LEXAPRO) 20 MG tablet Take 20 mg by mouth daily.     • levothyroxine (SYNTHROID) 112 MCG tablet Take 112  mcg by mouth daily.     • lisinopril (PRINIVIL,ZESTRIL) 10 MG tablet Take 1 tablet by mouth 2 (Two) Times a Day. 180 tablet 3   • metoprolol succinate XL (TOPROL-XL) 50 MG 24 hr tablet Take 1 tablet by mouth Daily. 90 tablet 3   • raNITIdine (ZANTAC) 300 MG tablet Take 1 tablet by mouth Every Night. 180 tablet 3   • ranolazine (RANEXA) 500 MG 12 hr tablet Take 1 tablet by mouth Every 12 (Twelve) Hours. 180 tablet 3   • vitamin B-12 (CYANOCOBALAMIN) 500 MCG tablet Take 500 mcg by mouth Daily.     • vitamin E 400 UNIT capsule Take 400 Units by mouth Daily.       No current facility-administered medications for this visit.        Codeine and Tylenol with codeine #3 [acetaminophen-codeine]    Past Medical History:   Diagnosis Date   • HTN (hypertension)    • Hx of cholecystectomy    • Hypercholesteremia    • Hyperlipidemia    • IHD (ischemic heart disease)     s/p stenting   • S/P complete hysterectomy     14 lb. cyst       Social History     Social History   • Marital status:      Spouse name: N/A   • Number of children: N/A   • Years of education: N/A     Occupational History   • Not on file.     Social History Main Topics   • Smoking status: Current Every Day Smoker     Packs/day: 0.50     Types: Cigarettes   • Smokeless tobacco: Never Used      Comment: patient counseled on effect's of tobacco use on health.    • Alcohol use No   • Drug use: No   • Sexual activity: Not on file     Other Topics Concern   • Not on file     Social History Narrative   • No narrative on file       Family History   Problem Relation Age of Onset   • Hypertension Mother    • Diabetes Mother    • Heart disease Father    • Diabetes Other    • Heart disease Other    • Hypertension Other        Review of Systems   Constitution: Negative for decreased appetite and weakness.   HENT: Negative for congestion, hoarse voice and nosebleeds.    Eyes: Negative for redness and visual disturbance.   Cardiovascular: Negative for chest pain, leg  "swelling, near-syncope and palpitations.   Respiratory: Positive for shortness of breath (with exertion, same ). Negative for sleep disturbances due to breathing.    Endocrine: Negative for polydipsia, polyphagia and polyuria.   Hematologic/Lymphatic: Negative for bleeding problem. Does not bruise/bleed easily.   Skin: Negative for color change and itching.   Musculoskeletal: Positive for joint pain (\"arthritis\", mild, not a new symptom). Negative for myalgias.   Gastrointestinal: Negative for change in bowel habit, melena and nausea.   Genitourinary: Negative for dysuria and hematuria.   Neurological: Negative for dizziness, headaches and loss of balance.   Psychiatric/Behavioral: Negative for memory loss. The patient does not have insomnia and is not nervous/anxious.         Objective     /94   Pulse 60   Ht 154.9 cm (61\")   Wt 73.5 kg (162 lb)   BMI 30.61 kg/m²     Physical Exam   Constitutional: She is oriented to person, place, and time. Vital signs are normal. She appears well-developed and well-nourished. She does not appear ill. No distress.   HENT:   Head: Normocephalic.   Eyes: Conjunctivae are normal. Pupils are equal, round, and reactive to light.   Neck: Normal range of motion. Neck supple. No JVD present. Carotid bruit is not present.   Cardiovascular: Normal rate, regular rhythm, S1 normal and S2 normal.    No murmur heard.  Pulmonary/Chest: Breath sounds normal. She has no wheezes. She has no rales.   Abdominal: Soft. Bowel sounds are normal. She exhibits no distension. There is no tenderness.   Musculoskeletal: Normal range of motion. She exhibits no edema.   Neurological: She is alert and oriented to person, place, and time.   Skin: Skin is warm and dry. No pallor.   Psychiatric: She has a normal mood and affect. Her behavior is normal.   Vitals reviewed.       Procedures: none today      Assessment/Plan      Nicki was seen today for follow-up and med refill.    Diagnoses and all orders " for this visit:    Ischemic heart disease    Essential hypertension    Pure hypercholesterolemia    Non-rheumatic mitral regurgitation    Obesity (BMI 30.0-34.9)    Cigarette smoker    Other orders  -     ranolazine (RANEXA) 500 MG 12 hr tablet; Take 1 tablet by mouth Every 12 (Twelve) Hours.  -     metoprolol succinate XL (TOPROL-XL) 50 MG 24 hr tablet; Take 1 tablet by mouth Daily.  -     lisinopril (PRINIVIL,ZESTRIL) 10 MG tablet; Take 1 tablet by mouth 2 (Two) Times a Day.  -     clopidogrel (PLAVIX) 75 MG tablet; Take 1 tablet by mouth Daily.  -     atorvastatin (LIPITOR) 10 MG tablet; Take 1 tablet by mouth Daily.    Her blood pressure is slightly increased today. At this time, same cardiac medications advised and refills given. She agrees to monitor blood pressure and call if remains increased.     Patient's Body mass index is 30.61 kg/m². BMI is above normal parameters. Recommendations include: nutrition counseling. Heart healthy and DASH diet advised. I encouraged her to remain physically active.     For management of labs including lipid panel, she will follow with you. Currently, she is tolerating Lipitor well, further statin therapy  recommendations based on lab results.      I advised Nicki of the risks of continuing to use tobacco, and I provided her with tobacco cessation educational materials in the After Visit Summary.   During this visit, I spent <3 minutes counseling the patient regarding tobacco cessation.     For management of CAD she is on Plavix, therefore not taking aspirin. She continues Ranexa and no cardiac symptoms reported. Cardiac murmur is soft. Nicki appears stable, therefore, no cardiac testing advised today. Should any symptoms or concerns develop she understands to call.     A 6 month follow up scheduled.            Electronically signed by JESSICA Keita,  July 12, 2018 5:06 PM

## 2019-01-16 ENCOUNTER — OFFICE VISIT (OUTPATIENT)
Dept: CARDIOLOGY | Facility: CLINIC | Age: 74
End: 2019-01-16

## 2019-01-16 VITALS
HEIGHT: 61 IN | BODY MASS INDEX: 30.66 KG/M2 | DIASTOLIC BLOOD PRESSURE: 100 MMHG | WEIGHT: 162.38 LBS | OXYGEN SATURATION: 98 % | SYSTOLIC BLOOD PRESSURE: 160 MMHG | HEART RATE: 81 BPM

## 2019-01-16 DIAGNOSIS — I10 ESSENTIAL HYPERTENSION: ICD-10-CM

## 2019-01-16 DIAGNOSIS — Z79.899 MEDICATION MANAGEMENT: ICD-10-CM

## 2019-01-16 DIAGNOSIS — I25.9 ISCHEMIC HEART DISEASE: Primary | ICD-10-CM

## 2019-01-16 DIAGNOSIS — E78.00 PURE HYPERCHOLESTEROLEMIA: ICD-10-CM

## 2019-01-16 DIAGNOSIS — F17.210 CIGARETTE SMOKER: ICD-10-CM

## 2019-01-16 DIAGNOSIS — I34.0 NON-RHEUMATIC MITRAL REGURGITATION: ICD-10-CM

## 2019-01-16 PROCEDURE — 99214 OFFICE O/P EST MOD 30 MIN: CPT | Performed by: NURSE PRACTITIONER

## 2019-01-16 RX ORDER — LOSARTAN POTASSIUM AND HYDROCHLOROTHIAZIDE 25; 100 MG/1; MG/1
1 TABLET ORAL DAILY
Qty: 30 TABLET | Refills: 11 | Status: SHIPPED | OUTPATIENT
Start: 2019-01-16 | End: 2019-07-18 | Stop reason: SDUPTHER

## 2019-01-16 RX ORDER — METOPROLOL SUCCINATE 50 MG/1
50 TABLET, EXTENDED RELEASE ORAL DAILY
Qty: 90 TABLET | Refills: 3 | Status: SHIPPED | OUTPATIENT
Start: 2019-01-16 | End: 2019-07-18 | Stop reason: SDUPTHER

## 2019-01-16 NOTE — PROGRESS NOTES
"Chief Complaint   Patient presents with   • Follow-up     Cardiac management. Denies chest pain, palpitations and shortness of breath. Recent labs with PCP will call for results.   • Med Refill     Needs refills on Metoprolol and Lisinopril, 90 days sent to [facility]       Subjective       Nicki Vela is a 73 y.o. female with a history of ischemic heart disease first diagnosed many years ago with stenting. Unfortunately, she has continued to smoke. In 2015, cardiac testing showed only an infarct. Since the septum was still well perfused, medical management was encouraged.     Today she comes to the office for a follow up visit. She denies chest pain, palpitations or issues with shortness of breath. She is maintaining her normal daily activities. Sometimes she feels flushed and has \"sweats\", not sure of BP during these episodes.     HPI     Cardiac History:    Past Surgical History:   Procedure Laterality Date   • CARDIOVASCULAR STRESS TEST  07/11/2007    Stress- 6 min, Inferior Infarct with Arlyn-Infarct Ischemia.   • CARDIOVASCULAR STRESS TEST  07/18/2011    Stress- 6 min, 76% THR. 134/80. Inferior Infarct with PeriInfarct Ischemia   • CARDIOVASCULAR STRESS TEST  12/16/2015    EF 51%, inferolateral wall infarct, LV function lower end normal, continue med management   • CATH LAB PROCEDURE  06/1999    Acute Inferior Wall MI. Cath and Stent of RCA   • CATH LAB PROCEDURE  11/13/2002    Cath- Mild In-Stent Stenosis.   • CHOLECYSTECTOMY     • ECHO - CONVERTED  06/27/2007    Echo- EF 44%. Inferoseptal WMA   • ECHO - CONVERTED  07/18/2011    Echo- EF>40%   • ECHO - CONVERTED  12/16/2015    EF 45-50%m mild MR, lateral WMA   • HYSTERECTOMY Bilateral     14 lb cyst       Current Outpatient Medications   Medication Sig Dispense Refill   • atorvastatin (LIPITOR) 10 MG tablet Take 1 tablet by mouth Daily. 90 tablet 3   • clopidogrel (PLAVIX) 75 MG tablet Take 1 tablet by mouth Daily. 90 tablet 3   • escitalopram " (LEXAPRO) 20 MG tablet Take 20 mg by mouth daily.     • levothyroxine (SYNTHROID) 112 MCG tablet Take 112 mcg by mouth daily.     • metoprolol succinate XL (TOPROL-XL) 50 MG 24 hr tablet Take 1 tablet by mouth Daily. 90 tablet 3   • raNITIdine (ZANTAC) 300 MG tablet Take 1 tablet by mouth Every Night. 180 tablet 3   • ranolazine (RANEXA) 500 MG 12 hr tablet Take 1 tablet by mouth Every 12 (Twelve) Hours. 180 tablet 3   • cetirizine (ZyrTEC) 10 MG tablet Take 10 mg by mouth daily.     • losartan-hydrochlorothiazide (HYZAAR) 100-25 MG per tablet Take 1 tablet by mouth Daily. 30 tablet 11   • vitamin B-12 (CYANOCOBALAMIN) 500 MCG tablet Take 500 mcg by mouth Daily.     • vitamin E 400 UNIT capsule Take 400 Units by mouth Daily.       No current facility-administered medications for this visit.        Codeine and Tylenol with codeine #3 [acetaminophen-codeine]    Past Medical History:   Diagnosis Date   • HTN (hypertension)    • Hx of cholecystectomy    • Hypercholesteremia    • Hyperlipidemia    • IHD (ischemic heart disease)     s/p stenting   • S/P complete hysterectomy     14 lb. cyst       Social History     Socioeconomic History   • Marital status:      Spouse name: Not on file   • Number of children: Not on file   • Years of education: Not on file   • Highest education level: Not on file   Social Needs   • Financial resource strain: Not on file   • Food insecurity - worry: Not on file   • Food insecurity - inability: Not on file   • Transportation needs - medical: Not on file   • Transportation needs - non-medical: Not on file   Occupational History   • Not on file   Tobacco Use   • Smoking status: Current Every Day Smoker     Packs/day: 0.50     Types: Cigarettes   • Smokeless tobacco: Never Used   • Tobacco comment: patient counseled on effect's of tobacco use on health.    Substance and Sexual Activity   • Alcohol use: No   • Drug use: No   • Sexual activity: Not on file   Other Topics Concern   • Not  "on file   Social History Narrative   • Not on file       Family History   Problem Relation Age of Onset   • Hypertension Mother    • Diabetes Mother    • Heart disease Father    • Diabetes Other    • Heart disease Other    • Hypertension Other        Review of Systems   Constitution: Positive for night sweats (\"sweats\" in general, not a new symptom). Negative for decreased appetite and malaise/fatigue.   HENT: Negative for congestion and nosebleeds.    Eyes: Negative for pain and redness.   Cardiovascular: Negative for chest pain, irregular heartbeat, leg swelling, near-syncope and palpitations.   Respiratory: Negative for shortness of breath, sleep disturbances due to breathing and sputum production.    Endocrine: Negative for polydipsia, polyphagia and polyuria.   Hematologic/Lymphatic: Negative for bleeding problem. Does not bruise/bleed easily.   Skin: Negative for dry skin and itching.   Musculoskeletal: Negative for falls, muscle cramps and muscle weakness.   Gastrointestinal: Negative for abdominal pain, melena and nausea.   Genitourinary: Negative for dysuria and hematuria.   Neurological: Negative for dizziness and light-headedness.        Objective     /100   Pulse 81   Ht 154.9 cm (61\")   Wt 73.7 kg (162 lb 6 oz)   SpO2 98%   BMI 30.68 kg/m²     Physical Exam   Constitutional: She is oriented to person, place, and time. She appears well-nourished. No distress.   HENT:   Head: Normocephalic.   Eyes: Conjunctivae are normal. Pupils are equal, round, and reactive to light.   Neck: Normal range of motion. Neck supple. Carotid bruit is not present.   Cardiovascular: Normal rate, regular rhythm, S1 normal and S2 normal.   Murmur heard.   Systolic murmur is present with a grade of 2/6.  Pulses:       Radial pulses are 2+ on the right side, and 2+ on the left side.   Pulmonary/Chest: Effort normal and breath sounds normal. She has no wheezes. She has no rales.   Abdominal: Soft. Bowel sounds are " normal. She exhibits no distension. There is no tenderness.   Musculoskeletal: Normal range of motion. She exhibits no edema.   Neurological: She is alert and oriented to person, place, and time.   Skin: Skin is warm and dry. No pallor.   Psychiatric: She has a normal mood and affect. Her behavior is normal.      Procedures: none today      Assessment/Plan      Nicki was seen today for follow-up and med refill.    Diagnoses and all orders for this visit:    Ischemic heart disease    Essential hypertension    Pure hypercholesterolemia    Non-rheumatic mitral regurgitation    Medication management    Cigarette smoker    Other orders  -     losartan-hydrochlorothiazide (HYZAAR) 100-25 MG per tablet; Take 1 tablet by mouth Daily.  -     metoprolol succinate XL (TOPROL-XL) 50 MG 24 hr tablet; Take 1 tablet by mouth Daily.    BP rechecked 144/100. Advised to stop Lisinopril. Start Hyzaar 50/12.5 mg daily. A BP monitoring sheet given. She understands to call if blood pressure does not improve. She denies symptoms and does not feel repeat cardiac workup warranted at this time. We reviewed most recent stress showed infarct but no ischemia. If she continues to have issues with BP or symptoms develop, then repeat stress and echo will be advised.     For CAD prevention, continue Plavix.     For cholesterol management, she is taking high intensity statin in form of Lipitor without side effects noted. Same dose advised and she will follow with you for lab orders/medication refill. Please forward a copy of lab results as available.     Patient's Body mass index is 30.68 kg/m². BMI is above normal parameters. Recommendations include: nutrition counseling. Heart healthy diet encouraged. DASH diet information given.     I advised Nicki of the risks of continuing to use tobacco, and I provided her with tobacco cessation educational materials in the After Visit Summary.   During this visit, I spent < 3  minutes counseling the patient  regarding tobacco cessation. Nicki does not feel she can stop smoking at this time     A 6 month follow up visit scheduled. Please call sooner for cardiac concerns.            Electronically signed by JESSICA Keita,  January 17, 2019 10:30 AM

## 2019-01-16 NOTE — PATIENT INSTRUCTIONS
Hydrochlorothiazide, HCTZ; Losartan tablets  What is this medicine?  LOSARTAN; HYDROCHLOROTHIAZIDE (mavis ADRIANA tan; nikolai droe klor oh ALEAHE a zide) is a combination of a drug that relaxes blood vessels and a diuretic. It is used to treat high blood pressure. This medicine may also reduce the risk of stroke in certain patients.  This medicine may be used for other purposes; ask your health care provider or pharmacist if you have questions.  COMMON BRAND NAME(S): Hyzaar  What should I tell my health care provider before I take this medicine?  They need to know if you have any of these conditions:  -decreased urine  -kidney disease  -liver disease  -if you are on a special diet, like a low-salt diet  -immune system problems, like lupus  -an unusual or allergic reaction to losartan, hydrochlorothiazide, sulfa drugs, other medicines, foods, dyes, or preservatives  -pregnant or trying to get pregnant  -breast-feeding  How should I use this medicine?  Take this medicine by mouth with a glass of water. Follow the directions on the prescription label. You can take it with or without food. If it upsets your stomach, take it with food. Take your medicine at regular intervals. Do not take it more often than directed. Do not stop taking except on your doctor's advice.  Talk to your pediatrician regarding the use of this medicine in children. Special care may be needed.  Overdosage: If you think you have taken too much of this medicine contact a poison control center or emergency room at once.  NOTE: This medicine is only for you. Do not share this medicine with others.  What if I miss a dose?  If you miss a dose, take it as soon as you can. If it is almost time for your next dose, take only that dose. Do not take double or extra doses.  What may interact with this medicine?  -barbiturates, like phenobarbital  -blood pressure medicines  -celecoxib  -cimetidine  -corticosteroids  -diabetic medicines  -diuretics, especially triamterene,  spironolactone or amiloride  -fluconazole  -lithium  -NSAIDs, medicines for pain and inflammation, like ibuprofen or naproxen  -potassium salts or potassium supplements  -prescription pain medicines  -rifampin  -skeletal muscle relaxants like tubocurarine  -some cholesterol-lowering medicines like cholestyramine or colestipol  This list may not describe all possible interactions. Give your health care provider a list of all the medicines, herbs, non-prescription drugs, or dietary supplements you use. Also tell them if you smoke, drink alcohol, or use illegal drugs. Some items may interact with your medicine.  What should I watch for while using this medicine?  Check your blood pressure regularly while you are taking this medicine. Ask your doctor or health care professional what your blood pressure should be and when you should contact him or her. When you check your blood pressure, write down the measurements to show your doctor or health care professional. If you are taking this medicine for a long time, you must visit your health care professional for regular checks on your progress. Make sure you schedule appointments on a regular basis.  You must not get dehydrated. Ask your doctor or health care professional how much fluid you need to drink a day. Check with him or her if you get an attack of severe diarrhea, nausea and vomiting, or if you sweat a lot. The loss of too much body fluid can make it dangerous for you to take this medicine.  Women should inform their doctor if they wish to become pregnant or think they might be pregnant. There is a potential for serious side effects to an unborn child, particularly in the second or third trimester. Talk to your health care professional or pharmacist for more information.  You may get drowsy or dizzy. Do not drive, use machinery, or do anything that needs mental alertness until you know how this drug affects you. Do not stand or sit up quickly, especially if you are  an older patient. This reduces the risk of dizzy or fainting spells. Alcohol can make you more drowsy and dizzy. Avoid alcoholic drinks.  This medicine may affect your blood sugar level. If you have diabetes, check with your doctor or health care professional before changing the dose of your diabetic medicine.  Avoid salt substitutes unless you are told otherwise by your doctor or health care professional.  Do not treat yourself for coughs, colds, or pain while you are taking this medicine without asking your doctor or health care professional for advice. Some ingredients may increase your blood pressure.  What side effects may I notice from receiving this medicine?  Side effects that you should report to your doctor or health care professional as soon as possible:  -allergic reactions like skin rash, itching or hives, swelling of the face, lips, or tongue  -breathing problems  -changes in vision  -dark urine  -eye pain  -fast or irregular heart beat, palpitations, or chest pain  -feeling faint or lightheaded  -muscle cramps  -persistent dry cough  -redness, blistering, peeling or loosening of the skin, including inside the mouth  -stomach pain  -trouble passing urine or change in the amount of urine  -unusual bleeding or bruising  -worsened gout pain  -yellowing of the eyes or skin  Side effects that usually do not require medical attention (report to your doctor or health care professional if they continue or are bothersome):  -change in sex drive or performance  -headache  This list may not describe all possible side effects. Call your doctor for medical advice about side effects. You may report side effects to FDA at 2-748-FDA-0564.  Where should I keep my medicine?  Keep out of the reach of children.  Store at room temperature between 15 and 30 degrees C (59 and 86 degrees F). Protect from light. Keep container tightly closed. Throw away any unused medicine after the expiration date.  NOTE: This sheet is a  "summary. It may not cover all possible information. If you have questions about this medicine, talk to your doctor, pharmacist, or health care provider.  © 2018 [employer]vier/Gold Standard (2011-09-07 13:57:32)    DASH Eating Plan  DASH stands for \"Dietary Approaches to Stop Hypertension.\" The DASH eating plan is a healthy eating plan that has been shown to reduce high blood pressure (hypertension). It may also reduce your risk for type 2 diabetes, heart disease, and stroke. The DASH eating plan may also help with weight loss.  What are tips for following this plan?  General guidelines  Avoid eating more than 2,300 mg (milligrams) of salt (sodium) a day. If you have hypertension, you may need to reduce your sodium intake to 1,500 mg a day.  Limit alcohol intake to no more than 1 drink a day for nonpregnant women and 2 drinks a day for men. One drink equals 12 oz of beer, 5 oz of wine, or 1½ oz of hard liquor.  Work with your health care provider to maintain a healthy body weight or to lose weight. Ask what an ideal weight is for you.  Get at least 30 minutes of exercise that causes your heart to beat faster (aerobic exercise) most days of the week. Activities may include walking, swimming, or biking.  Work with your health care provider or diet and nutrition specialist (dietitian) to adjust your eating plan to your individual calorie needs.  Reading food labels  Check food labels for the amount of sodium per serving. Choose foods with less than 5 percent of the Daily Value of sodium. Generally, foods with less than 300 mg of sodium per serving fit into this eating plan.  To find whole grains, look for the word \"whole\" as the first word in the ingredient list.  Shopping  Buy products labeled as \"low-sodium\" or \"no salt added.\"  Buy fresh foods. Avoid canned foods and premade or frozen meals.  Cooking  Avoid adding salt when cooking. Use salt-free seasonings or herbs instead of table salt or sea salt. Check with your " health care provider or pharmacist before using salt substitutes.  Do not mills foods. Cook foods using healthy methods such as baking, boiling, grilling, and broiling instead.  Cook with heart-healthy oils, such as olive, canola, soybean, or sunflower oil.  Meal planning    Eat a balanced diet that includes:  5 or more servings of fruits and vegetables each day. At each meal, try to fill half of your plate with fruits and vegetables.  Up to 6-8 servings of whole grains each day.  Less than 6 oz of lean meat, poultry, or fish each day. A 3-oz serving of meat is about the same size as a deck of cards. One egg equals 1 oz.  2 servings of low-fat dairy each day.  A serving of nuts, seeds, or beans 5 times each week.  Heart-healthy fats. Healthy fats called Omega-3 fatty acids are found in foods such as flaxseeds and coldwater fish, like sardines, salmon, and mackerel.  Limit how much you eat of the following:  Canned or prepackaged foods.  Food that is high in trans fat, such as fried foods.  Food that is high in saturated fat, such as fatty meat.  Sweets, desserts, sugary drinks, and other foods with added sugar.  Full-fat dairy products.  Do not salt foods before eating.  Try to eat at least 2 vegetarian meals each week.  Eat more home-cooked food and less restaurant, buffet, and fast food.  When eating at a restaurant, ask that your food be prepared with less salt or no salt, if possible.  What foods are recommended?  The items listed may not be a complete list. Talk with your dietitian about what dietary choices are best for you.  Grains  Whole-grain or whole-wheat bread. Whole-grain or whole-wheat pasta. Brown rice. Oatmeal. Quinoa. Bulgur. Whole-grain and low-sodium cereals. Dory bread. Low-fat, low-sodium crackers. Whole-wheat flour tortillas.  Vegetables  Fresh or frozen vegetables (raw, steamed, roasted, or grilled). Low-sodium or reduced-sodium tomato and vegetable juice. Low-sodium or reduced-sodium tomato  sauce and tomato paste. Low-sodium or reduced-sodium canned vegetables.  Fruits  All fresh, dried, or frozen fruit. Canned fruit in natural juice (without added sugar).  Meat and other protein foods  Skinless chicken or turkey. Ground chicken or turkey. Pork with fat trimmed off. Fish and seafood. Egg whites. Dried beans, peas, or lentils. Unsalted nuts, nut butters, and seeds. Unsalted canned beans. Lean cuts of beef with fat trimmed off. Low-sodium, lean deli meat.  Dairy  Low-fat (1%) or fat-free (skim) milk. Fat-free, low-fat, or reduced-fat cheeses. Nonfat, low-sodium ricotta or cottage cheese. Low-fat or nonfat yogurt. Low-fat, low-sodium cheese.  Fats and oils  Soft margarine without trans fats. Vegetable oil. Low-fat, reduced-fat, or light mayonnaise and salad dressings (reduced-sodium). Canola, safflower, olive, soybean, and sunflower oils. Avocado.  Seasoning and other foods  Herbs. Spices. Seasoning mixes without salt. Unsalted popcorn and pretzels. Fat-free sweets.  What foods are not recommended?  The items listed may not be a complete list. Talk with your dietitian about what dietary choices are best for you.  Grains  Baked goods made with fat, such as croissants, muffins, or some breads. Dry pasta or rice meal packs.  Vegetables  Creamed or fried vegetables. Vegetables in a cheese sauce. Regular canned vegetables (not low-sodium or reduced-sodium). Regular canned tomato sauce and paste (not low-sodium or reduced-sodium). Regular tomato and vegetable juice (not low-sodium or reduced-sodium). Pickles. Olives.  Fruits  Canned fruit in a light or heavy syrup. Fried fruit. Fruit in cream or butter sauce.  Meat and other protein foods  Fatty cuts of meat. Ribs. Fried meat. [place]nelas. Sausage. Bologna and other processed lunch meats. Salami. Fatback. Hotdogs. Bratwurst. Salted nuts and seeds. Canned beans with added salt. Canned or smoked fish. Whole eggs or egg yolks. Chicken or turkey with skin.  Dairy  Whole  or 2% milk, cream, and half-and-half. Whole or full-fat cream cheese. Whole-fat or sweetened yogurt. Full-fat cheese. Nondairy creamers. Whipped toppings. Processed cheese and cheese spreads.  Fats and oils  Butter. Stick margarine. Lard. Shortening. Ghee. [place]nelas fat. Tropical oils, such as coconut, palm kernel, or palm oil.  Seasoning and other foods  Salted popcorn and pretzels. Onion salt, garlic salt, seasoned salt, table salt, and sea salt. Worcestershire sauce. Tartar sauce. Barbecue sauce. Teriyaki sauce. Soy sauce, including reduced-sodium. Steak sauce. Canned and packaged gravies. Fish sauce. Oyster sauce. Cocktail sauce. Horseradish that you find on the shelf. Ketchup. Mustard. Meat flavorings and tenderizers. Bouillon cubes. Hot sauce and Tabasco sauce. Premade or packaged marinades. Premade or packaged taco seasonings. Relishes. Regular salad dressings.  Where to find more information:  National Heart, Lung, and Blood [place]: www.nhlbi.nih.gov  American Heart Association: www.heart.org  Summary  The DASH eating plan is a healthy eating plan that has been shown to reduce high blood pressure (hypertension). It may also reduce your risk for type 2 diabetes, heart disease, and stroke.  With the DASH eating plan, you should limit salt (sodium) intake to 2,300 mg a day. If you have hypertension, you may need to reduce your sodium intake to 1,500 mg a day.  When on the DASH eating plan, aim to eat more fresh fruits and vegetables, whole grains, lean proteins, low-fat dairy, and heart-healthy fats.  Work with your health care provider or diet and nutrition specialist (dietitian) to adjust your eating plan to your individual calorie needs.  This information is not intended to replace advice given to you by your health care provider. Make sure you discuss any questions you have with your health care provider.  Document Released: 12/06/2012 Document Revised: 12/11/2017 Document Reviewed: 12/11/2017  [place]  "Interactive Patient Education © 2018 [employer].    Hypertension  Hypertension, commonly called high blood pressure, is when the force of blood pumping through the arteries is too strong. The arteries are the blood vessels that carry blood from the heart throughout the body. Hypertension forces the heart to work harder to pump blood and may cause arteries to become narrow or stiff. Having untreated or uncontrolled hypertension can cause heart attacks, strokes, kidney disease, and other problems.  A blood pressure reading consists of a higher number over a lower number. Ideally, your blood pressure should be below 120/80. The first (\"top\") number is called the systolic pressure. It is a measure of the pressure in your arteries as your heart beats. The second (\"bottom\") number is called the diastolic pressure. It is a measure of the pressure in your arteries as the heart relaxes.  What are the causes?  The cause of this condition is not known.  What increases the risk?  Some risk factors for high blood pressure are under your control. Others are not.  Factors you can change  · Smoking.  · Having type 2 diabetes mellitus, high cholesterol, or both.  · Not getting enough exercise or physical activity.  · Being overweight.  · Having too much fat, sugar, calories, or salt (sodium) in your diet.  · Drinking too much alcohol.  Factors that are difficult or impossible to change  · Having chronic kidney disease.  · Having a family history of high blood pressure.  · Age. Risk increases with age.  · Race. You may be at higher risk if you are -American.  · Gender. Men are at higher risk than women before age 45. After age 65, women are at higher risk than men.  · Having obstructive sleep apnea.  · Stress.  What are the signs or symptoms?  Extremely high blood pressure (hypertensive crisis) may cause:  · Headache.  · Anxiety.  · Shortness of breath.  · Nosebleed.  · Nausea and vomiting.  · Severe chest pain.  · Jerky " movements you cannot control (seizures).    How is this diagnosed?  This condition is diagnosed by measuring your blood pressure while you are seated, with your arm resting on a surface. The cuff of the blood pressure monitor will be placed directly against the skin of your upper arm at the level of your heart. It should be measured at least twice using the same arm. Certain conditions can cause a difference in blood pressure between your right and left arms.  Certain factors can cause blood pressure readings to be lower or higher than normal (elevated) for a short period of time:  · When your blood pressure is higher when you are in a health care provider's office than when you are at home, this is called white coat hypertension. Most people with this condition do not need medicines.  · When your blood pressure is higher at home than when you are in a health care provider's office, this is called masked hypertension. Most people with this condition may need medicines to control blood pressure.    If you have a high blood pressure reading during one visit or you have normal blood pressure with other risk factors:  · You may be asked to return on a different day to have your blood pressure checked again.  · You may be asked to monitor your blood pressure at home for 1 week or longer.    If you are diagnosed with hypertension, you may have other blood or imaging tests to help your health care provider understand your overall risk for other conditions.  How is this treated?  This condition is treated by making healthy lifestyle changes, such as eating healthy foods, exercising more, and reducing your alcohol intake. Your health care provider may prescribe medicine if lifestyle changes are not enough to get your blood pressure under control, and if:  · Your systolic blood pressure is above 130.  · Your diastolic blood pressure is above 80.    Your personal target blood pressure may vary depending on your medical  conditions, your age, and other factors.  Follow these instructions at home:  Eating and drinking  · Eat a diet that is high in fiber and potassium, and low in sodium, added sugar, and fat. An example eating plan is called the DASH (Dietary Approaches to Stop Hypertension) diet. To eat this way:  ? Eat plenty of fresh fruits and vegetables. Try to fill half of your plate at each meal with fruits and vegetables.  ? Eat whole grains, such as whole wheat pasta, brown rice, or whole grain bread. Fill about one quarter of your plate with whole grains.  ? Eat or drink low-fat dairy products, such as skim milk or low-fat yogurt.  ? Avoid fatty cuts of meat, processed or cured meats, and poultry with skin. Fill about one quarter of your plate with lean proteins, such as fish, chicken without skin, beans, eggs, and tofu.  ? Avoid premade and processed foods. These tend to be higher in sodium, added sugar, and fat.  · Reduce your daily sodium intake. Most people with hypertension should eat less than 1,500 mg of sodium a day.  · Limit alcohol intake to no more than 1 drink a day for nonpregnant women and 2 drinks a day for men. One drink equals 12 oz of beer, 5 oz of wine, or 1½ oz of hard liquor.  Lifestyle  · Work with your health care provider to maintain a healthy body weight or to lose weight. Ask what an ideal weight is for you.  · Get at least 30 minutes of exercise that causes your heart to beat faster (aerobic exercise) most days of the week. Activities may include walking, swimming, or biking.  · Include exercise to strengthen your muscles (resistance exercise), such as pilates or lifting weights, as part of your weekly exercise routine. Try to do these types of exercises for 30 minutes at least 3 days a week.  · Do not use any products that contain nicotine or tobacco, such as cigarettes and e-cigarettes. If you need help quitting, ask your health care provider.  · Monitor your blood pressure at home as told by  your health care provider.  · Keep all follow-up visits as told by your health care provider. This is important.  Medicines  · Take over-the-counter and prescription medicines only as told by your health care provider. Follow directions carefully. Blood pressure medicines must be taken as prescribed.  · Do not skip doses of blood pressure medicine. Doing this puts you at risk for problems and can make the medicine less effective.  · Ask your health care provider about side effects or reactions to medicines that you should watch for.  Contact a health care provider if:  · You think you are having a reaction to a medicine you are taking.  · You have headaches that keep coming back (recurring).  · You feel dizzy.  · You have swelling in your ankles.  · You have trouble with your vision.  Get help right away if:  · You develop a severe headache or confusion.  · You have unusual weakness or numbness.  · You feel faint.  · You have severe pain in your chest or abdomen.  · You vomit repeatedly.  · You have trouble breathing.  Summary  · Hypertension is when the force of blood pumping through your arteries is too strong. If this condition is not controlled, it may put you at risk for serious complications.  · Your personal target blood pressure may vary depending on your medical conditions, your age, and other factors. For most people, a normal blood pressure is less than 120/80.  · Hypertension is treated with lifestyle changes, medicines, or a combination of both. Lifestyle changes include weight loss, eating a healthy, low-sodium diet, exercising more, and limiting alcohol.  This information is not intended to replace advice given to you by your health care provider. Make sure you discuss any questions you have with your health care provider.  Document Released: 12/18/2006 Document Revised: 11/15/2017 Document Reviewed: 11/15/2017  [place] Interactive Patient Education © 2018 Elsevier Inc.    For more  information:    Quit Now [place]  1-800-QUIT-NOW  https://[place].quitlogix.org/en-[place]/  Steps to Quit Smoking  Smoking tobacco can be harmful to your health and can affect almost every organ in your body. Smoking puts you, and those around you, at risk for developing many serious chronic diseases. Quitting smoking is difficult, but it is one of the best things that you can do for your health. It is never too late to quit.  What are the benefits of quitting smoking?  When you quit smoking, you lower your risk of developing serious diseases and conditions, such as:  · Lung cancer or lung disease, such as COPD.  · Heart disease.  · Stroke.  · Heart attack.  · Infertility.  · Osteoporosis and bone fractures.  Additionally, symptoms such as coughing, wheezing, and shortness of breath may get better when you quit. You may also find that you get sick less often because your body is stronger at fighting off colds and infections. If you are pregnant, quitting smoking can help to reduce your chances of having a baby of low birth weight.  How do I get ready to quit?  When you decide to quit smoking, create a plan to make sure that you are successful. Before you quit:  · Pick a date to quit. Set a date within the next two weeks to give you time to prepare.  · Write down the reasons why you are quitting. Keep this list in places where you will see it often, such as on your bathroom mirror or in your car or wallet.  · Identify the people, places, things, and activities that make you want to smoke (triggers) and avoid them. Make sure to take these actions:  ¨ Throw away all cigarettes at home, at work, and in your car.  ¨ Throw away smoking accessories, such as ashtrays and lighters.  ¨ Clean your car and make sure to empty the ashtray.  ¨ Clean your home, including curtains and carpets.  · Tell your family, friends, and coworkers that you are quitting. Support from your loved ones can make quitting easier.  · Talk with your  health care provider about your options for quitting smoking.  · Find out what treatment options are covered by your health insurance.  What strategies can I use to quit smoking?  Talk with your healthcare provider about different strategies to quit smoking. Some strategies include:  · Quitting smoking altogether instead of gradually lessening how much you smoke over a period of time. Research shows that quitting “cold turkey” is more successful than gradually quitting.  · Attending in-person counseling to help you build problem-solving skills. You are more likely to have success in quitting if you attend several counseling sessions. Even short sessions of 10 minutes can be effective.  · Finding resources and support systems that can help you to quit smoking and remain smoke-free after you quit. These resources are most helpful when you use them often. They can include:  ¨ Online chats with a counselor.  ¨ Telephone quitlines.  ¨ Printed self-help materials.  ¨ Support groups or group counseling.  ¨ Text messaging programs.  ¨ Mobile phone applications.  · Taking medicines to help you quit smoking. (If you are pregnant or breastfeeding, talk with your health care provider first.) Some medicines contain nicotine and some do not. Both types of medicines help with cravings, but the medicines that include nicotine help to relieve withdrawal symptoms. Your health care provider may recommend:  ¨ Nicotine patches, gum, or lozenges.  ¨ Nicotine inhalers or sprays.  ¨ Non-nicotine medicine that is taken by mouth.  Talk with your health care provider about combining strategies, such as taking medicines while you are also receiving in-person counseling. Using these two strategies together makes you more likely to succeed in quitting than if you used either strategy on its own.  If you are pregnant or breastfeeding, talk with your health care provider about finding counseling or other support strategies to quit smoking. Do not  take medicine to help you quit smoking unless told to do so by your health care provider.  What things can I do to make it easier to quit?  Quitting smoking might feel overwhelming at first, but there is a lot that you can do to make it easier. Take these important actions:  · Reach out to your family and friends and ask that they support and encourage you during this time. Call telephone quitlines, reach out to support groups, or work with a counselor for support.  · Ask people who smoke to avoid smoking around you.  · Avoid places that trigger you to smoke, such as bars, parties, or smoke-break areas at work.  · Spend time around people who do not smoke.  · Lessen stress in your life, because stress can be a smoking trigger for some people. To lessen stress, try:  ¨ Exercising regularly.  ¨ Deep-breathing exercises.  ¨ Yoga.  ¨ Meditating.  ¨ Performing a body scan. This involves closing your eyes, scanning your body from head to toe, and noticing which parts of your body are particularly tense. Purposefully relax the muscles in those areas.  · Download or purchase mobile phone or tablet apps (applications) that can help you stick to your quit plan by providing reminders, tips, and encouragement. There are many free apps, such as QuitGuide from the CDC (Centers for Disease Control and Prevention). You can find other support for quitting smoking (smoking cessation) through smokefree.gov and other websites.  How will I feel when I quit smoking?  Within the first 24 hours of quitting smoking, you may start to feel some withdrawal symptoms. These symptoms are usually most noticeable 2-3 days after quitting, but they usually do not last beyond 2-3 weeks. Changes or symptoms that you might experience include:  · Mood swings.  · Restlessness, anxiety, or irritation.  · Difficulty concentrating.  · Dizziness.  · Strong cravings for sugary foods in addition to nicotine.  · Mild weight  gain.  · Constipation.  · Nausea.  · Coughing or a sore throat.  · Changes in how your medicines work in your body.  · A depressed mood.  · Difficulty sleeping (insomnia).  After the first 2-3 weeks of quitting, you may start to notice more positive results, such as:  · Improved sense of smell and taste.  · Decreased coughing and sore throat.  · Slower heart rate.  · Lower blood pressure.  · Clearer skin.  · The ability to breathe more easily.  · Fewer sick days.  Quitting smoking is very challenging for most people. Do not get discouraged if you are not successful the first time. Some people need to make many attempts to quit before they achieve long-term success. Do your best to stick to your quit plan, and talk with your health care provider if you have any questions or concerns.  This information is not intended to replace advice given to you by your health care provider. Make sure you discuss any questions you have with your health care provider.  Document Released: 12/12/2002 Document Revised: 08/15/2017 Document Reviewed: 05/03/2016  Elsevier Interactive Patient Education © 2017 [employer]

## 2019-07-18 ENCOUNTER — OFFICE VISIT (OUTPATIENT)
Dept: CARDIOLOGY | Facility: CLINIC | Age: 74
End: 2019-07-18

## 2019-07-18 VITALS
WEIGHT: 160.8 LBS | HEIGHT: 61 IN | DIASTOLIC BLOOD PRESSURE: 82 MMHG | HEART RATE: 70 BPM | SYSTOLIC BLOOD PRESSURE: 140 MMHG | BODY MASS INDEX: 30.36 KG/M2

## 2019-07-18 DIAGNOSIS — I34.0 NON-RHEUMATIC MITRAL REGURGITATION: ICD-10-CM

## 2019-07-18 DIAGNOSIS — E78.00 PURE HYPERCHOLESTEROLEMIA: ICD-10-CM

## 2019-07-18 DIAGNOSIS — I25.9 ISCHEMIC HEART DISEASE: Primary | ICD-10-CM

## 2019-07-18 DIAGNOSIS — I10 ESSENTIAL HYPERTENSION: ICD-10-CM

## 2019-07-18 DIAGNOSIS — F17.210 CIGARETTE SMOKER: ICD-10-CM

## 2019-07-18 PROCEDURE — 99213 OFFICE O/P EST LOW 20 MIN: CPT | Performed by: NURSE PRACTITIONER

## 2019-07-18 RX ORDER — RANOLAZINE 500 MG/1
500 TABLET, EXTENDED RELEASE ORAL EVERY 12 HOURS SCHEDULED
Qty: 180 TABLET | Refills: 3 | Status: SHIPPED | OUTPATIENT
Start: 2019-07-18 | End: 2020-08-28

## 2019-07-18 RX ORDER — LOSARTAN POTASSIUM AND HYDROCHLOROTHIAZIDE 25; 100 MG/1; MG/1
1 TABLET ORAL DAILY
Qty: 90 TABLET | Refills: 3 | Status: SHIPPED | OUTPATIENT
Start: 2019-07-18 | End: 2020-08-28

## 2019-07-18 RX ORDER — ATORVASTATIN CALCIUM 10 MG/1
10 TABLET, FILM COATED ORAL DAILY
Qty: 90 TABLET | Refills: 3 | Status: SHIPPED | OUTPATIENT
Start: 2019-07-18 | End: 2021-03-25

## 2019-07-18 RX ORDER — METOPROLOL SUCCINATE 50 MG/1
50 TABLET, EXTENDED RELEASE ORAL DAILY
Qty: 90 TABLET | Refills: 3 | Status: SHIPPED | OUTPATIENT
Start: 2019-07-18 | End: 2020-09-17 | Stop reason: SDUPTHER

## 2019-07-18 RX ORDER — CLOPIDOGREL BISULFATE 75 MG/1
75 TABLET ORAL DAILY
Qty: 90 TABLET | Refills: 3 | Status: SHIPPED | OUTPATIENT
Start: 2019-07-18 | End: 2020-09-18

## 2019-07-18 NOTE — PROGRESS NOTES
Chief Complaint   Patient presents with   • Follow-up     Cardiac management . No cardiac complaints today   .has new diagnosis of Palisaded Granulomatous dermatitis   • Med Refill     all cardiac meds  90 day refill to [facility]       Nicki eVla is a 74 y.o. female  with a history of ischemic heart disease first diagnosed many years ago with stenting. Unfortunately, she has continued to smoke. In 2015, cardiac testing showed only an infarct. Since the septum was still well perfused, medical management was encouraged.  At her January 2019 office visit lisinopril was stopped and Hyzaar started for better blood pressure control.    Today Nicki comes to the office for a follow-up visit.  She denies chest pain, palpitations, dizziness, or increased shortness of breath.  Unfortunately she continues to smoke.  No recent change in cardiac medications are noted.  She has developed significant skin rash for which she seen dermatologist and diagnosed with Palisaded granulomatous dermatitis.     HPI     Cardiac History:    Past Surgical History:   Procedure Laterality Date   • CARDIOVASCULAR STRESS TEST  07/11/2007    Stress- 6 min, Inferior Infarct with Arlyn-Infarct Ischemia.   • CARDIOVASCULAR STRESS TEST  07/18/2011    Stress- 6 min, 76% THR. 134/80. Inferior Infarct with PeriInfarct Ischemia   • CARDIOVASCULAR STRESS TEST  12/16/2015    EF 51%, inferolateral wall infarct, LV function lower end normal, continue med management   • CATH LAB PROCEDURE  06/1999    Acute Inferior Wall MI. Cath and Stent of RCA   • CATH LAB PROCEDURE  11/13/2002    Cath- Mild In-Stent Stenosis.   • CHOLECYSTECTOMY     • ECHO - CONVERTED  06/27/2007    Echo- EF 44%. Inferoseptal WMA   • ECHO - CONVERTED  07/18/2011    Echo- EF>40%   • ECHO - CONVERTED  12/16/2015    EF 45-50%m mild MR, lateral WMA   • HYSTERECTOMY Bilateral     14 lb cyst       Current Outpatient Medications   Medication Sig Dispense Refill   •  atorvastatin (LIPITOR) 10 MG tablet Take 1 tablet by mouth Daily. 90 tablet 3   • cetirizine (ZyrTEC) 10 MG tablet Take 10 mg by mouth daily.     • clopidogrel (PLAVIX) 75 MG tablet Take 1 tablet by mouth Daily. 90 tablet 3   • escitalopram (LEXAPRO) 20 MG tablet Take 20 mg by mouth daily.     • levothyroxine (SYNTHROID) 112 MCG tablet Take 112 mcg by mouth daily.     • losartan-hydrochlorothiazide (HYZAAR) 100-25 MG per tablet Take 1 tablet by mouth Daily. 90 tablet 3   • metoprolol succinate XL (TOPROL-XL) 50 MG 24 hr tablet Take 1 tablet by mouth Daily. 90 tablet 3   • raNITIdine (ZANTAC) 300 MG tablet Take 1 tablet by mouth Every Night. 180 tablet 3   • ranolazine (RANEXA) 500 MG 12 hr tablet Take 1 tablet by mouth Every 12 (Twelve) Hours. 180 tablet 3   • vitamin B-12 (CYANOCOBALAMIN) 500 MCG tablet Take 500 mcg by mouth Daily.     • vitamin E 400 UNIT capsule Take 400 Units by mouth Daily.       No current facility-administered medications for this visit.        Codeine and Tylenol with codeine #3 [acetaminophen-codeine]    Past Medical History:   Diagnosis Date   • HTN (hypertension)    • Hx of cholecystectomy    • Hypercholesteremia    • Hyperlipidemia    • IHD (ischemic heart disease)     s/p stenting   • S/P complete hysterectomy     14 lb. cyst       Social History     Socioeconomic History   • Marital status:      Spouse name: Not on file   • Number of children: Not on file   • Years of education: Not on file   • Highest education level: Not on file   Tobacco Use   • Smoking status: Current Every Day Smoker     Packs/day: 0.50     Types: Cigarettes   • Smokeless tobacco: Never Used   • Tobacco comment: patient counseled on effect's of tobacco use on health.    Substance and Sexual Activity   • Alcohol use: No   • Drug use: No       Family History   Problem Relation Age of Onset   • Hypertension Mother    • Diabetes Mother    • Heart disease Father    • Diabetes Other    • Heart disease Other    •  "Hypertension Other        Review of Systems   Constitution: Positive for diaphoresis (\"I sweat a lot\", not a new problem). Negative for decreased appetite and weakness.   HENT: Negative for congestion, hoarse voice and nosebleeds.    Eyes: Negative for redness and visual disturbance.   Cardiovascular: Negative for chest pain, leg swelling, near-syncope and palpitations.   Respiratory: Positive for cough and shortness of breath (with exertion, same). Negative for sputum production.    Endocrine: Negative for polydipsia, polyphagia and polyuria.   Hematologic/Lymphatic: Negative for bleeding problem. Does not bruise/bleed easily.   Skin: Positive for rash. Negative for itching.   Musculoskeletal: Negative for falls, muscle cramps and muscle weakness.   Gastrointestinal: Negative for abdominal pain, heartburn, melena and nausea.   Genitourinary: Negative for dysuria and hematuria.   Neurological: Negative for dizziness, headaches, numbness and paresthesias.   Psychiatric/Behavioral: Negative for memory loss. The patient is not nervous/anxious.    Allergic/Immunologic: Negative for hives and persistent infections.        Objective     /82 (BP Location: Left arm)   Pulse 70   Ht 154.9 cm (61\")   Wt 72.9 kg (160 lb 12.8 oz)   BMI 30.38 kg/m²     Physical Exam   Constitutional: She is oriented to person, place, and time. She appears well-nourished.   HENT:   Head: Normocephalic.   Eyes: Conjunctivae are normal. Pupils are equal, round, and reactive to light.   Neck: Normal range of motion. Neck supple. Carotid bruit is not present.   Cardiovascular: Normal rate, regular rhythm, S1 normal and S2 normal.   Murmur heard.   Systolic murmur is present with a grade of 2/6.  Pulses:       Radial pulses are 2+ on the right side, and 2+ on the left side.   Pulmonary/Chest: She has wheezes. She has no rales.   Abdominal: Soft. Bowel sounds are normal. She exhibits no distension. There is no tenderness.   Musculoskeletal: " "Normal range of motion. She exhibits no edema.   Neurological: She is alert and oriented to person, place, and time.   Skin: Skin is warm and dry.   Psychiatric: She has a normal mood and affect. Her behavior is normal.        Procedures: none today        Assessment/Plan      Nicki was seen today for follow-up and med refill.    Diagnoses and all orders for this visit:    Ischemic heart disease  -     ranolazine (RANEXA) 500 MG 12 hr tablet; Take 1 tablet by mouth Every 12 (Twelve) Hours.  -     clopidogrel (PLAVIX) 75 MG tablet; Take 1 tablet by mouth Daily.    Essential hypertension  -     metoprolol succinate XL (TOPROL-XL) 50 MG 24 hr tablet; Take 1 tablet by mouth Daily.  -     losartan-hydrochlorothiazide (HYZAAR) 100-25 MG per tablet; Take 1 tablet by mouth Daily.    Pure hypercholesterolemia  -     atorvastatin (LIPITOR) 10 MG tablet; Take 1 tablet by mouth Daily.    Non-rheumatic mitral regurgitation    Cigarette smoker    Nicki presents to the office today without cardiac concerns or complaints.  Her vital signs are stable.  Same dose of Toprol and Hyzaar advised.  Refills given.  For management of ischemic heart disease she is on daily Plavix and aspirin without issue.  Continue same.  She has not had recurrence of angina continues and continues Ranexa without problem.  Refills given.    At next visit will plan to repeat cardiac workup due to history and length of time from last testing. Today she does not feel having any issues to warrant testing sooner.  She does agree to call should any symptoms or problems develop. She does voice concern over \"sweating a lot\".  Could be possible side effect of Lexapro.  I advised her to further discuss with you.    Patient's Body mass index is 30.38 kg/m². BMI is above normal parameters. Recommendations include: nutrition counseling.  Diet for weight loss encouraged.     She is on statin therapy in the form of Lipitor.  She follows with you for lab orders/management. "  Please forward a copy of lab results as available.    Nicki Vela is a current cigarettes user.  She currently smokes 1 pack of cigarettes per day for a duration of 50 years. I have educated her on the risk of diseases from using tobacco products such as cancer, COPD and heart diease.     I advised her to quit and she is not willing to quit.I spent 3  minutes counseling the patient.    A 6-month follow-up visit scheduled.  Please call sooner for any cardiac concerns.             Electronically signed by JESSICA Keita,  July 19, 2019 9:00 AM

## 2020-03-17 ENCOUNTER — OFFICE VISIT (OUTPATIENT)
Dept: CARDIOLOGY | Facility: CLINIC | Age: 75
End: 2020-03-17

## 2020-03-17 VITALS
SYSTOLIC BLOOD PRESSURE: 142 MMHG | WEIGHT: 163.6 LBS | BODY MASS INDEX: 30.89 KG/M2 | DIASTOLIC BLOOD PRESSURE: 72 MMHG | HEART RATE: 74 BPM | HEIGHT: 61 IN

## 2020-03-17 DIAGNOSIS — E78.00 PURE HYPERCHOLESTEROLEMIA: ICD-10-CM

## 2020-03-17 DIAGNOSIS — I25.9 ISCHEMIC HEART DISEASE: ICD-10-CM

## 2020-03-17 DIAGNOSIS — F17.210 CIGARETTE SMOKER: ICD-10-CM

## 2020-03-17 DIAGNOSIS — I10 ESSENTIAL HYPERTENSION: ICD-10-CM

## 2020-03-17 DIAGNOSIS — I34.0 NON-RHEUMATIC MITRAL REGURGITATION: ICD-10-CM

## 2020-03-17 PROCEDURE — 99214 OFFICE O/P EST MOD 30 MIN: CPT | Performed by: NURSE PRACTITIONER

## 2020-03-17 NOTE — PROGRESS NOTES
Chief Complaint   Patient presents with   • Follow-up     Cardiac management .  Labs done per PCP a couple of weeks ago. Has no cardiac complaints today.   • Med Refill     No refills needed today. HAd medication bottles with her. She has not been taking Atorvastatin , reports cholesterol is low.        Subjective       Nicki Veal is a 74 y.o. female with a history of ischemic heart disease first diagnosed many years ago with stenting. Unfortunately, she has continued to smoke. In 2015, cardiac testing showed only an infarct. Since the septum was still well perfused, medical management was encouraged.  At her January 2019 office visit lisinopril was stopped and Hyzaar started for better blood pressure control. She has liv developed significant skin rash for which she seen dermatologist and diagnosed with Palisaded granulomatous dermatitis.     Today she comes to the office for a follow up visit.  She denies cardiac concerns or complaints at this time.  According to patient her most recent labs showed lipids were low therefore she has stopped taking atorvastatin.  She plans to have labs repeated in the next month or so and if cholesterol has increased then she will resume the medication.  Her rash has improved after treatment by dermatologist.  Her main concern is nasal drainage and chest congestion for which she requests an antibiotic.    HPI     Cardiac History:    Past Surgical History:   Procedure Laterality Date   • CARDIOVASCULAR STRESS TEST  07/11/2007    Stress- 6 min, Inferior Infarct with Arlyn-Infarct Ischemia.   • CARDIOVASCULAR STRESS TEST  07/18/2011    Stress- 6 min, 76% THR. 134/80. Inferior Infarct with PeriInfarct Ischemia   • CARDIOVASCULAR STRESS TEST  12/16/2015    EF 51%, inferolateral wall infarct, LV function lower end normal, continue med management   • CATH LAB PROCEDURE  06/1999    Acute Inferior Wall MI. Cath and Stent of RCA   • CATH LAB PROCEDURE  11/13/2002    Cath- Mild In-Stent  Stenosis.   • CHOLECYSTECTOMY     • ECHO - CONVERTED  06/27/2007    Echo- EF 44%. Inferoseptal WMA   • ECHO - CONVERTED  07/18/2011    Echo- EF>40%   • ECHO - CONVERTED  12/16/2015    EF 45-50%m mild MR, lateral WMA   • HYSTERECTOMY Bilateral     14 lb cyst       Current Outpatient Medications   Medication Sig Dispense Refill   • cetirizine (ZyrTEC) 10 MG tablet Take 10 mg by mouth daily.     • clopidogrel (PLAVIX) 75 MG tablet Take 1 tablet by mouth Daily. 90 tablet 3   • escitalopram (LEXAPRO) 20 MG tablet Take 20 mg by mouth daily.     • levothyroxine (SYNTHROID) 112 MCG tablet Take 112 mcg by mouth daily.     • losartan-hydrochlorothiazide (HYZAAR) 100-25 MG per tablet Take 1 tablet by mouth Daily. 90 tablet 3   • metoprolol succinate XL (TOPROL-XL) 50 MG 24 hr tablet Take 1 tablet by mouth Daily. 90 tablet 3   • ranolazine (RANEXA) 500 MG 12 hr tablet Take 1 tablet by mouth Every 12 (Twelve) Hours. 180 tablet 3   • vitamin B-12 (CYANOCOBALAMIN) 500 MCG tablet Take 500 mcg by mouth Daily.     • vitamin E 400 UNIT capsule Take 400 Units by mouth Daily.     • atorvastatin (LIPITOR) 10 MG tablet Take 1 tablet by mouth Daily. 90 tablet 3     No current facility-administered medications for this visit.        Codeine and Tylenol with codeine #3 [acetaminophen-codeine]    Past Medical History:   Diagnosis Date   • HTN (hypertension)    • Hx of cholecystectomy    • Hypercholesteremia    • Hyperlipidemia    • IHD (ischemic heart disease)     s/p stenting   • S/P complete hysterectomy     14 lb. cyst       Social History     Socioeconomic History   • Marital status:      Spouse name: Not on file   • Number of children: Not on file   • Years of education: Not on file   • Highest education level: Not on file   Tobacco Use   • Smoking status: Current Every Day Smoker     Packs/day: 0.50     Types: Cigarettes   • Smokeless tobacco: Never Used   • Tobacco comment: patient counseled on effect's of tobacco use on health.  "   Substance and Sexual Activity   • Alcohol use: No   • Drug use: No       Family History   Problem Relation Age of Onset   • Hypertension Mother    • Diabetes Mother    • Heart disease Father    • Diabetes Other    • Heart disease Other    • Hypertension Other        Review of Systems   Constitution: Positive for malaise/fatigue (at times, not a new or worsening problem). Negative for decreased appetite, diaphoresis and fever.   HENT: Positive for congestion. Negative for nosebleeds.    Eyes: Negative.    Cardiovascular: Negative for chest pain, leg swelling, near-syncope and palpitations.   Respiratory: Positive for cough and shortness of breath (with exertion, same). Negative for sputum production.    Endocrine: Negative for polydipsia, polyphagia and polyuria.   Hematologic/Lymphatic: Negative for bleeding problem. Does not bruise/bleed easily.   Skin: Positive for rash (manged by dermatologist).   Musculoskeletal: Positive for arthritis. Negative for muscle cramps, muscle weakness and myalgias.   Gastrointestinal: Negative for change in bowel habit, melena and nausea.   Genitourinary: Negative for dysuria and hematuria.   Neurological: Negative for dizziness, light-headedness, loss of balance and weakness.   Psychiatric/Behavioral: Negative for memory loss. The patient does not have insomnia and is not nervous/anxious.    Allergic/Immunologic: Positive for environmental allergies. Negative for hives and persistent infections.        Objective     /72 (BP Location: Left arm)   Pulse 74   Ht 154.9 cm (61\")   Wt 74.2 kg (163 lb 9.6 oz)   BMI 30.91 kg/m²     Physical Exam   Constitutional: She is oriented to person, place, and time. She appears well-nourished.   HENT:   Head: Normocephalic.   Eyes: Pupils are equal, round, and reactive to light. Conjunctivae are normal.   Neck: Normal range of motion. Neck supple.   Cardiovascular: Normal rate, regular rhythm, S1 normal and S2 normal.   No murmur " heard.  Pulmonary/Chest: Breath sounds normal. She has no wheezes. She has no rales. She exhibits no tenderness.   Abdominal: Soft. Bowel sounds are normal.   Musculoskeletal: Normal range of motion. She exhibits no edema.   Neurological: She is alert and oriented to person, place, and time.   Skin: Skin is warm and dry. No pallor.   Psychiatric: She has a normal mood and affect.        Procedures: none today         Assessment/Plan      Nicki was seen today for follow-up and med refill.    Diagnoses and all orders for this visit:    Ischemic heart disease    Essential hypertension    Pure hypercholesterolemia    Non-rheumatic mitral regurgitation    Cigarette smoker      IHD/murmur-patient presents without cardiac concerns or symptoms voiced.  She remains on Ranexa and Plavix therapy without issues.  Her last cardiac work-up in December 2015 showed small area of infarct but no ischemia.  Her overall LV function was slightly decreased.  At this time she declines repeat cardiac work-up as she remains asymptomatic.  At next visit we will consider repeat stress and echo due to being 5 years and her cardiac history/risk.  Should any problems develop sooner she understands to call.    Hypertension-blood pressure controlled.  Continue Hyzaar 100-25 daily and Toprol-XL 50 mg daily.    Hypercholesterolemia-patient is currently off statin therapy as she reports lipids were low.  She follows with you for lab orders/management.  Please forward a copy of next lab results?    Patient's Body mass index is 30.91 kg/m². BMI is above normal parameters. Recommendations include: nutrition counseling.  Her weight is up about 3 pounds since last office visit.  Patient states she has difficulty trying to lose weight despite dietary efforts.  I encouraged her on Mediterranean type diet and decrease caloric intake for benefit of weight loss.  Also encouraged her on routine exercise such as walking.     Nicki Vela  reports that she has been  smoking cigarettes. She has been smoking about 0.50 packs per day. She has never used smokeless tobacco.. I have educated her on the risk of diseases from using tobacco products such as cancer, COPD and heart diease.   I advised her to quit and she is not willing to quit. I spent 3  minutes counseling the patient.    Patient reported nasal drainage and chest congestion for which she requested antibiotic.  She denies fevers or signs of bacterial infection.  I discussed with her negative effects of antibiotic therapy.  I advised her to monitor for any fever or worsening symptoms.  Also advised her to follow-up with you for further evaluation and management.     A 6-month follow-up visit scheduled.  Please call sooner for cardiac concerns.           Electronically signed by JESSICA Keita,  March 18, 2020 09:44

## 2020-08-28 DIAGNOSIS — I25.9 ISCHEMIC HEART DISEASE: ICD-10-CM

## 2020-08-28 DIAGNOSIS — I10 ESSENTIAL HYPERTENSION: ICD-10-CM

## 2020-08-28 RX ORDER — RANOLAZINE 500 MG/1
TABLET, EXTENDED RELEASE ORAL
Qty: 180 TABLET | Refills: 0 | Status: SHIPPED | OUTPATIENT
Start: 2020-08-28 | End: 2020-09-17 | Stop reason: SDUPTHER

## 2020-08-28 RX ORDER — LOSARTAN POTASSIUM AND HYDROCHLOROTHIAZIDE 25; 100 MG/1; MG/1
TABLET ORAL
Qty: 90 TABLET | Refills: 0 | Status: SHIPPED | OUTPATIENT
Start: 2020-08-28 | End: 2020-09-17 | Stop reason: SDUPTHER

## 2020-09-17 ENCOUNTER — OFFICE VISIT (OUTPATIENT)
Dept: CARDIOLOGY | Facility: CLINIC | Age: 75
End: 2020-09-17

## 2020-09-17 VITALS
WEIGHT: 161.8 LBS | HEIGHT: 61 IN | HEART RATE: 70 BPM | DIASTOLIC BLOOD PRESSURE: 70 MMHG | SYSTOLIC BLOOD PRESSURE: 136 MMHG | TEMPERATURE: 97.2 F | BODY MASS INDEX: 30.55 KG/M2

## 2020-09-17 DIAGNOSIS — I34.0 NON-RHEUMATIC MITRAL REGURGITATION: ICD-10-CM

## 2020-09-17 DIAGNOSIS — I51.9 LV DYSFUNCTION: ICD-10-CM

## 2020-09-17 DIAGNOSIS — E78.00 PURE HYPERCHOLESTEROLEMIA: ICD-10-CM

## 2020-09-17 DIAGNOSIS — I10 ESSENTIAL HYPERTENSION: ICD-10-CM

## 2020-09-17 DIAGNOSIS — Z95.5 HISTORY OF CORONARY ARTERY STENT PLACEMENT: ICD-10-CM

## 2020-09-17 DIAGNOSIS — I25.9 ISCHEMIC HEART DISEASE: Primary | ICD-10-CM

## 2020-09-17 DIAGNOSIS — F17.210 CIGARETTE SMOKER: ICD-10-CM

## 2020-09-17 PROCEDURE — 99213 OFFICE O/P EST LOW 20 MIN: CPT | Performed by: NURSE PRACTITIONER

## 2020-09-17 RX ORDER — LOSARTAN POTASSIUM AND HYDROCHLOROTHIAZIDE 25; 100 MG/1; MG/1
1 TABLET ORAL DAILY
Qty: 90 TABLET | Refills: 4 | Status: SHIPPED | OUTPATIENT
Start: 2020-09-17 | End: 2021-09-29 | Stop reason: SDUPTHER

## 2020-09-17 RX ORDER — RANOLAZINE 500 MG/1
500 TABLET, EXTENDED RELEASE ORAL EVERY 12 HOURS
Qty: 180 TABLET | Refills: 4 | Status: SHIPPED | OUTPATIENT
Start: 2020-09-17 | End: 2021-09-29 | Stop reason: SDUPTHER

## 2020-09-17 RX ORDER — METOPROLOL SUCCINATE 50 MG/1
50 TABLET, EXTENDED RELEASE ORAL DAILY
Qty: 90 TABLET | Refills: 4 | Status: SHIPPED | OUTPATIENT
Start: 2020-09-17 | End: 2021-09-29 | Stop reason: SDUPTHER

## 2020-09-17 NOTE — PROGRESS NOTES
Chief Complaint   Patient presents with   • Follow-up     Cardiac management . Has no current labs. HAs no cardiac complaints today.   • Med Refill     Needs refills on Hyzaar, Toprol,Ranexa and B12 . 90- day supply to [facility] . HAd med bottle today       Subjective       Nicki Vela is a 75 y.o. female with a history of ischemic heart disease first diagnosed many years ago with stenting. Unfortunately, she has continued to smoke. In 2015, cardiac testing showed only an infarct. Since the septum was still well perfused, medical management was encouraged.  At her January 2019 office visit lisinopril was stopped and Hyzaar started for better blood pressure control. She has liv developed significant skin rash for which she seen dermatologist and diagnosed with Palisaded granulomatous dermatitis.    Today she comes to the office for a follow up visit. No cardiac complaints are voiced. She admits to increased shortness of breath and fatigue with strenuous activity. With normal daily activities she denies issues. No recent change in cardiac medications noted.        Cardiac History:    Past Surgical History:   Procedure Laterality Date   • CARDIOVASCULAR STRESS TEST  07/11/2007    Stress- 6 min, Inferior Infarct with Arlyn-Infarct Ischemia.   • CARDIOVASCULAR STRESS TEST  07/18/2011    Stress- 6 min, 76% THR. 134/80. Inferior Infarct with PeriInfarct Ischemia   • CARDIOVASCULAR STRESS TEST  12/16/2015    EF 51%, inferolateral wall infarct, LV function lower end normal, continue med management   • CATH LAB PROCEDURE  06/1999    Acute Inferior Wall MI. Cath and Stent of RCA   • CATH LAB PROCEDURE  11/13/2002    Cath- Mild In-Stent Stenosis.   • CHOLECYSTECTOMY     • ECHO - CONVERTED  06/27/2007    Echo- EF 44%. Inferoseptal WMA   • ECHO - CONVERTED  07/18/2011    Echo- EF>40%   • ECHO - CONVERTED  12/16/2015    EF 45-50%m mild MR, lateral WMA   • HYSTERECTOMY Bilateral     14 lb cyst       Current Outpatient  Medications   Medication Sig Dispense Refill   • cetirizine (ZyrTEC) 10 MG tablet Take 10 mg by mouth daily.     • clopidogrel (PLAVIX) 75 MG tablet Take 1 tablet by mouth Daily. 90 tablet 3   • escitalopram (LEXAPRO) 20 MG tablet Take 20 mg by mouth daily.     • levothyroxine (SYNTHROID) 112 MCG tablet Take 112 mcg by mouth daily.     • losartan-hydrochlorothiazide (HYZAAR) 100-25 MG per tablet Take 1 tablet by mouth Daily. 90 tablet 4   • metoprolol succinate XL (TOPROL-XL) 50 MG 24 hr tablet Take 1 tablet by mouth Daily. 90 tablet 4   • ranolazine (RANEXA) 500 MG 12 hr tablet Take 1 tablet by mouth Every 12 (Twelve) Hours. 180 tablet 4   • vitamin B-12 (CYANOCOBALAMIN) 500 MCG tablet Take 500 mcg by mouth Daily.     • vitamin E 400 UNIT capsule Take 400 Units by mouth Daily.     • atorvastatin (LIPITOR) 10 MG tablet Take 1 tablet by mouth Daily. 90 tablet 3     No current facility-administered medications for this visit.        Codeine and Tylenol with codeine #3 [acetaminophen-codeine]    Past Medical History:   Diagnosis Date   • HTN (hypertension)    • Hx of cholecystectomy    • Hypercholesteremia    • Hyperlipidemia    • IHD (ischemic heart disease)     s/p stenting   • S/P complete hysterectomy     14 lb. cyst       Social History     Socioeconomic History   • Marital status:      Spouse name: Not on file   • Number of children: Not on file   • Years of education: Not on file   • Highest education level: Not on file   Tobacco Use   • Smoking status: Current Every Day Smoker     Packs/day: 0.50     Types: Cigarettes   • Smokeless tobacco: Never Used   • Tobacco comment: patient counseled on effect's of tobacco use on health.    Substance and Sexual Activity   • Alcohol use: No   • Drug use: No       Family History   Problem Relation Age of Onset   • Hypertension Mother    • Diabetes Mother    • Heart disease Father    • Diabetes Other    • Heart disease Other    • Hypertension Other        Review of  "Systems   Constitution: Negative for decreased appetite, diaphoresis and malaise/fatigue.   HENT: Negative for nosebleeds.    Eyes: Negative for blurred vision.   Cardiovascular: Negative for chest pain, claudication, cyanosis, dyspnea on exertion, irregular heartbeat, leg swelling, near-syncope, orthopnea, palpitations, paroxysmal nocturnal dyspnea and syncope.   Respiratory: Negative for shortness of breath and snoring.    Endocrine: Negative for cold intolerance and heat intolerance.   Hematologic/Lymphatic: Does not bruise/bleed easily.   Skin: Negative for rash.   Musculoskeletal: Negative for myalgias.   Gastrointestinal: Negative for heartburn, melena and nausea.   Genitourinary: Negative for dysuria and hematuria.   Neurological: Negative for dizziness and light-headedness.   Psychiatric/Behavioral: The patient does not have insomnia and is not nervous/anxious.         Objective     /70 (BP Location: Left arm)   Pulse 70   Temp 97.2 °F (36.2 °C)   Ht 154.9 cm (61\")   Wt 73.4 kg (161 lb 12.8 oz)   BMI 30.57 kg/m²     Eyes:      Pupils: Pupils are equal, round, and reactive to light.   HENT:      Head: Normocephalic.   Neck:      Musculoskeletal: Normal range of motion.      Vascular: No carotid bruit.   Pulmonary:      Breath sounds: Normal breath sounds.   Cardiovascular:      Normal rate. Regular rhythm.      Murmurs: There is a grade 3/6 mid frequency systolic murmur.   Pulses:     Radial: 2+ bilaterally.  Abdominal:      General: Bowel sounds are normal.      Palpations: Abdomen is soft.   Musculoskeletal: Normal range of motion.   Skin:     General: Skin is warm.   Neurological:      Mental Status: Alert and oriented to person, place, and time.          Procedures: none today       Problem List Items Addressed This Visit        Cardiovascular and Mediastinum    Hyperlipidemia    Ischemic heart disease - Primary    Relevant Medications    metoprolol succinate XL (TOPROL-XL) 50 MG 24 hr tablet   " "   ranolazine (RANEXA) 500 MG 12 hr tablet    Hypertension    Relevant Medications    metoprolol succinate XL (TOPROL-XL) 50 MG 24 hr tablet    losartan-hydrochlorothiazide (HYZAAR) 100-25 MG per tablet    Non-rheumatic mitral regurgitation    Relevant Medications    metoprolol succinate XL (TOPROL-XL) 50 MG 24 hr tablet    ranolazine (RANEXA) 500 MG 12 hr tablet    LV dysfunction    Relevant Medications    metoprolol succinate XL (TOPROL-XL) 50 MG 24 hr tablet    ranolazine (RANEXA) 500 MG 12 hr tablet       Other    Cigarette smoker    History of coronary artery stent placement         IHD- stress in 2015 showed area of infarct and decreased LVEF around 45%. She presents today without cardiac complaints. We discsused repeat stress and echo due to cardiac history, risk and length of time from last testing. At this time she declines due to Covid issues and remaining asymptomatic. At next visit will consider repeat stress and echo. She agrees to call sooner for any cardiac issues.     Hypercholesterolemia- Currently she is not taking Lipitor due to \"cholesterol was to low\". Advise to recheck lipids. If LDL increased then recommend resuming for management of CAD. Please forward copy of next lab results.     HTN- Bp normal today. No change in cardiac medications advised. Refills faxed to her pharmacy.     Patient's Body mass index is 30.57 kg/m². BMI is above normal parameters. Recommendations include: nutrition counseling.       Nicki Vela  reports that she has been smoking cigarettes. She has been smoking about 0.50 packs per day. She has never used smokeless tobacco.. I have educated her on the risk of diseases from using tobacco products such as cancer, COPD and heart diease.   I advised her to quit and she is not willing to quit. I spent 3  minutes counseling the patient.    A 6 month follow up visit scheduled. Please call sooner for any cardiac concerns.            Electronically signed by JESSICA Keita,  " September 18, 2020 09:51 EDT

## 2020-09-18 DIAGNOSIS — I25.9 ISCHEMIC HEART DISEASE: ICD-10-CM

## 2020-09-18 RX ORDER — CLOPIDOGREL BISULFATE 75 MG/1
TABLET ORAL
Qty: 90 TABLET | Refills: 0 | Status: SHIPPED | OUTPATIENT
Start: 2020-09-18 | End: 2021-01-05

## 2021-01-04 DIAGNOSIS — I25.9 ISCHEMIC HEART DISEASE: ICD-10-CM

## 2021-01-05 RX ORDER — CLOPIDOGREL BISULFATE 75 MG/1
TABLET ORAL
Qty: 90 TABLET | Refills: 0 | Status: SHIPPED | OUTPATIENT
Start: 2021-01-05 | End: 2021-09-29 | Stop reason: SDUPTHER

## 2021-03-25 ENCOUNTER — OFFICE VISIT (OUTPATIENT)
Dept: CARDIOLOGY | Facility: CLINIC | Age: 76
End: 2021-03-25

## 2021-03-25 VITALS
BODY MASS INDEX: 29.34 KG/M2 | DIASTOLIC BLOOD PRESSURE: 70 MMHG | SYSTOLIC BLOOD PRESSURE: 140 MMHG | HEART RATE: 68 BPM | WEIGHT: 155.4 LBS | TEMPERATURE: 97.8 F | HEIGHT: 61 IN

## 2021-03-25 DIAGNOSIS — I51.9 LV DYSFUNCTION: ICD-10-CM

## 2021-03-25 DIAGNOSIS — E78.00 PURE HYPERCHOLESTEROLEMIA: ICD-10-CM

## 2021-03-25 DIAGNOSIS — I10 ESSENTIAL HYPERTENSION: ICD-10-CM

## 2021-03-25 DIAGNOSIS — F17.210 CIGARETTE SMOKER: ICD-10-CM

## 2021-03-25 DIAGNOSIS — I25.9 ISCHEMIC HEART DISEASE: Primary | ICD-10-CM

## 2021-03-25 DIAGNOSIS — Z95.5 HISTORY OF CORONARY ARTERY STENT PLACEMENT: ICD-10-CM

## 2021-03-25 DIAGNOSIS — I34.0 NON-RHEUMATIC MITRAL REGURGITATION: ICD-10-CM

## 2021-03-25 PROCEDURE — 99213 OFFICE O/P EST LOW 20 MIN: CPT | Performed by: NURSE PRACTITIONER

## 2021-03-25 NOTE — PATIENT INSTRUCTIONS
Advance Care Planning and Advance Directives     You make decisions on a daily basis - decisions about where you want to live, your career, your home, your life. Perhaps one of the most important decisions you face is your choice for future medical care. Take time to talk with your family and your healthcare team and start planning today.  Advance Care Planning is a process that can help you:  · Understand possible future healthcare decisions in light of your own experiences  · Reflect on those decision in light of your goals and values  · Discuss your decisions with those closest to you and the healthcare professionals that care for you  · Make a plan by creating a document that reflects your wishes    Surrogate Decision Maker  In the event of a medical emergency, which has left you unable to communicate or to make your own decisions, you would need someone to make decisions for you.  It is important to discuss your preferences for medical treatment with this person while you are in good health.     Qualities of a surrogate decision maker:  • Willing to take on this role and responsibility  • Knows what you want for future medical care  • Willing to follow your wishes even if they don't agree with them  • Able to make difficult medical decisions under stressful circumstances    Advance Directives  These are legal documents you can create that will guide your healthcare team and decision maker(s) when needed. These documents can be stored in the electronic medical record.    · Living Will - a legal document to guide your care if you have a terminal condition or a serious illness and are unable to communicate. States vary by statute in document names/types, but most forms may include one or more of the following:        -  Directions regarding life-prolonging treatments        -  Directions regarding artificially provided nutrition/hydration        -  Choosing a healthcare decision maker        -  Direction  regarding organ/tissue donation    · Durable Power of  for Healthcare - this document names an -in-fact to make medical decisions for you, but it may also allow this person to make personal and financial decisions for you. Please seek the advice of an  if you need this type of document.    **Advance Directives are not required and no one may discriminate against you if you do not sign one.    Medical Orders  Many states allow specific forms/orders signed by your physician to record your wishes for medical treatment in your current state of health. This form, signed in personal communication with your physician, addresses resuscitation and other medical interventions that you may or may not want.      For more information or to schedule a time with a [facility] Advance Care Planning Facilitator contact: [email or web address] or call [phone] and someone will contact you directly.

## 2021-03-25 NOTE — PROGRESS NOTES
Chief Complaint   Patient presents with   • Follow-up     cardiac management. pt is not on daily aspirin. pt states she feels good today.   • Med Refill     will need cardiac meds refilled 90 days to [facility] in [place]   • Labs     last labs 3/10/21 with PCP, not in chart. States PCP recommended her start Vit D.       Subjective       Nicki Vela is a 75 y.o. female with a history of ischemic heart disease first diagnosed many years ago with stenting. Unfortunately, she has continued to smoke. In 2015, cardiac testing showed only an infarct. Since the septum was still well perfused, medical management was encouraged.  At her January 2019 office visit lisinopril was stopped and Hyzaar started for better blood pressure control. She has liv developed significant skin rash for which she seen dermatologist and diagnosed with Palisaded granulomatous dermatitis.    Today she comes the office for follow-up visit.  No cardiac complaints or concerns are voiced.  She continues to have issues with sinus and allergies but after recent treatment with antibiotic therapy the symptoms have improved some.    Cardiac History:    Past Surgical History:   Procedure Laterality Date   • CARDIOVASCULAR STRESS TEST  07/11/2007    Stress- 6 min, Inferior Infarct with Arlyn-Infarct Ischemia.   • CARDIOVASCULAR STRESS TEST  07/18/2011    Stress- 6 min, 76% THR. 134/80. Inferior Infarct with PeriInfarct Ischemia   • CARDIOVASCULAR STRESS TEST  12/16/2015    EF 51%, inferolateral wall infarct, LV function lower end normal, continue med management   • CATH LAB PROCEDURE  06/1999    Acute Inferior Wall MI. Cath and Stent of RCA   • CATH LAB PROCEDURE  11/13/2002    Cath- Mild In-Stent Stenosis.   • CHOLECYSTECTOMY     • ECHO - CONVERTED  06/27/2007    Echo- EF 44%. Inferoseptal WMA   • ECHO - CONVERTED  07/18/2011    Echo- EF>40%   • ECHO - CONVERTED  12/16/2015    EF 45-50%m mild MR, lateral WMA   • HYSTERECTOMY Bilateral      14 lb cyst       Current Outpatient Medications   Medication Sig Dispense Refill   • clopidogrel (PLAVIX) 75 MG tablet TAKE ONE TABLET BY MOUTH ONE TIME DAILY  90 tablet 0   • escitalopram (LEXAPRO) 20 MG tablet Take 20 mg by mouth daily.     • levothyroxine (SYNTHROID) 112 MCG tablet Take 112 mcg by mouth daily.     • Loratadine (CLARITIN PO) Take  by mouth Daily.     • losartan-hydrochlorothiazide (HYZAAR) 100-25 MG per tablet Take 1 tablet by mouth Daily. 90 tablet 4   • metoprolol succinate XL (TOPROL-XL) 50 MG 24 hr tablet Take 1 tablet by mouth Daily. 90 tablet 4   • ranolazine (RANEXA) 500 MG 12 hr tablet Take 1 tablet by mouth Every 12 (Twelve) Hours. 180 tablet 4   • vitamin B-12 (CYANOCOBALAMIN) 500 MCG tablet Take 500 mcg by mouth Daily.     • vitamin E 400 UNIT capsule Take 400 Units by mouth Daily.       No current facility-administered medications for this visit.       Codeine and Tylenol with codeine #3 [acetaminophen-codeine]    Past Medical History:   Diagnosis Date   • HTN (hypertension)    • Hx of cholecystectomy    • Hypercholesteremia    • Hyperlipidemia    • IHD (ischemic heart disease)     s/p stenting   • S/P complete hysterectomy     14 lb. cyst       Social History     Socioeconomic History   • Marital status:      Spouse name: Not on file   • Number of children: Not on file   • Years of education: Not on file   • Highest education level: Not on file   Tobacco Use   • Smoking status: Current Every Day Smoker     Packs/day: 0.50     Types: Cigarettes   • Smokeless tobacco: Never Used   • Tobacco comment: patient counseled on effect's of tobacco use on health.    Vaping Use   • Vaping Use: Never used   Substance and Sexual Activity   • Alcohol use: No   • Drug use: No       Family History   Problem Relation Age of Onset   • Hypertension Mother    • Diabetes Mother    • Heart disease Father    • Diabetes Other    • Heart disease Other    • Hypertension Other        Review of Systems  "  Constitutional: Negative for decreased appetite, diaphoresis and malaise/fatigue.   HENT: Positive for congestion. Negative for ear pain, nosebleeds and sore throat.    Eyes: Negative for blurred vision.   Cardiovascular: Negative for chest pain, claudication, cyanosis, dyspnea on exertion, irregular heartbeat, leg swelling, near-syncope, orthopnea, palpitations, paroxysmal nocturnal dyspnea and syncope.   Respiratory: Positive for cough and shortness of breath. Negative for sleep disturbances due to breathing and snoring.    Endocrine: Negative for cold intolerance and heat intolerance.   Hematologic/Lymphatic: Negative for adenopathy. Does not bruise/bleed easily.   Skin: Negative for rash.   Musculoskeletal: Negative for myalgias.   Gastrointestinal: Negative for heartburn, melena and nausea.   Genitourinary: Negative for dysuria and hematuria.   Neurological: Negative for dizziness, light-headedness and weakness.   Psychiatric/Behavioral: The patient is nervous/anxious. The patient does not have insomnia.         BP Readings from Last 5 Encounters:   03/25/21 140/70   09/17/20 136/70   03/17/20 142/72   07/18/19 140/82   01/16/19 160/100       Wt Readings from Last 5 Encounters:   03/25/21 70.5 kg (155 lb 6.4 oz)   09/17/20 73.4 kg (161 lb 12.8 oz)   03/17/20 74.2 kg (163 lb 9.6 oz)   07/18/19 72.9 kg (160 lb 12.8 oz)   01/16/19 73.7 kg (162 lb 6 oz)       Objective     /70 (BP Location: Right arm)   Pulse 68   Temp 97.8 °F (36.6 °C)   Ht 154.9 cm (60.98\")   Wt 70.5 kg (155 lb 6.4 oz)   BMI 29.38 kg/m²     Vitals and nursing note reviewed.   Eyes:      Pupils: Pupils are equal, round, and reactive to light.   HENT:      Head: Normocephalic.   Neck:      Vascular: No carotid bruit.   Pulmonary:      Effort: Pulmonary effort is normal.      Breath sounds: Wheezing present.   Cardiovascular:      Normal rate. Regular rhythm.   Abdominal:      General: Bowel sounds are normal.      Palpations: Abdomen " is soft.   Musculoskeletal: Normal range of motion.      Cervical back: Normal range of motion. Skin:     General: Skin is warm.   Neurological:      Mental Status: Alert and oriented to person, place, and time.          Procedures: none today          Assessment/Plan   Diagnoses and all orders for this visit:    1. Ischemic heart disease (Primary)    2. History of coronary artery stent placement    3. LV dysfunction    4. Essential hypertension    5. Pure hypercholesterolemia    6. Non-rheumatic mitral regurgitation    7. Cigarette smoker      IHD/LV dysfunction -last stress test was over 5 years ago but negative at that time.  Given she remains asymptomatic repeat testing deferred.  Continue Plavix and Ranexa.    Hypertension-blood pressure high normal today.  According to patient her blood pressure has been controlled most often.  Continue same antihypertensive agents.  DASH diet encouraged.  Recently she has been under more stress in regards to 's illness.  I encouraged her to continue to monitor blood pressure and call for any issues.    Hypercholesterolemia-diet managed.  She will follow with you for lab orders/management.  Please forward copy of next lab report.     MR-echo in 2015 showed mild MR.  Cardiac murmur clinically stable.  No repeat testing advised at this time.    Patient's Body mass index is 29.38 kg/m². BMI is above normal parameters. Recommendations include: nutrition counseling.     Nicki Vela  reports that she has been smoking cigarettes. She has been smoking about 0.50 packs per day. She has never used smokeless tobacco.. I have educated her on the risk of diseases from using tobacco products such as cancer, COPD and heart disease.  Patient does not feel she can stop smoking at this time.    Currently patient appears stable from a cardiac standpoint.  Should any symptoms or concerns develop she understands to call and repeat cardiac testing will be advised.  Otherwise, we will see her  back in 6 months.             Electronically signed by JESSICA Keita,  March 26, 2021 08:59 EDT

## 2021-09-29 DIAGNOSIS — I25.9 ISCHEMIC HEART DISEASE: ICD-10-CM

## 2021-09-29 DIAGNOSIS — I10 ESSENTIAL HYPERTENSION: ICD-10-CM

## 2021-09-29 RX ORDER — LOSARTAN POTASSIUM AND HYDROCHLOROTHIAZIDE 25; 100 MG/1; MG/1
1 TABLET ORAL DAILY
Qty: 90 TABLET | Refills: 4 | Status: SHIPPED | OUTPATIENT
Start: 2021-09-29 | End: 2022-10-18 | Stop reason: SDUPTHER

## 2021-09-29 RX ORDER — CLOPIDOGREL BISULFATE 75 MG/1
75 TABLET ORAL DAILY
Qty: 90 TABLET | Refills: 3 | Status: SHIPPED | OUTPATIENT
Start: 2021-09-29 | End: 2022-10-18 | Stop reason: SDUPTHER

## 2021-09-29 RX ORDER — METOPROLOL SUCCINATE 50 MG/1
50 TABLET, EXTENDED RELEASE ORAL DAILY
Qty: 90 TABLET | Refills: 4 | Status: SHIPPED | OUTPATIENT
Start: 2021-09-29 | End: 2022-10-18 | Stop reason: SDUPTHER

## 2021-09-29 RX ORDER — RANOLAZINE 500 MG/1
500 TABLET, EXTENDED RELEASE ORAL EVERY 12 HOURS
Qty: 180 TABLET | Refills: 4 | Status: SHIPPED | OUTPATIENT
Start: 2021-09-29 | End: 2022-10-18 | Stop reason: SDUPTHER

## 2022-10-18 DIAGNOSIS — I25.9 ISCHEMIC HEART DISEASE: ICD-10-CM

## 2022-10-18 DIAGNOSIS — I10 ESSENTIAL HYPERTENSION: ICD-10-CM

## 2022-10-18 NOTE — TELEPHONE ENCOUNTER
Caller: Nicki Vela    Relationship: Self    Best call back number: [phone]    Requested Prescriptions:   Requested Prescriptions     Pending Prescriptions Disp Refills   • losartan-hydrochlorothiazide (HYZAAR) 100-25 MG per tablet 90 tablet 4     Sig: Take 1 tablet by mouth Daily.   • metoprolol succinate XL (TOPROL-XL) 50 MG 24 hr tablet 90 tablet 4     Sig: Take 1 tablet by mouth Daily.   • ranolazine (RANEXA) 500 MG 12 hr tablet 180 tablet 4     Sig: Take 1 tablet by mouth Every 12 (Twelve) Hours.   • clopidogrel (PLAVIX) 75 MG tablet 90 tablet 3     Sig: Take 1 tablet by mouth Daily.        Pharmacy where request should be sent: [facility] [address] [place] [facility] [phone] [facility] [phone] [facility]     Additional details provided by patient: 0 DAYS LEFT - WOULD LIKE 90 DAY SUPPLY    Does the patient have less than a 3 day supply:  [x] Yes  [] No    Noe Velasquez Rep   10/18/22 10:15 EDT

## 2022-10-19 RX ORDER — LOSARTAN POTASSIUM AND HYDROCHLOROTHIAZIDE 25; 100 MG/1; MG/1
1 TABLET ORAL DAILY
Qty: 30 TABLET | Refills: 0 | Status: SHIPPED | OUTPATIENT
Start: 2022-10-19 | End: 2022-10-24 | Stop reason: SDUPTHER

## 2022-10-19 RX ORDER — METOPROLOL SUCCINATE 50 MG/1
50 TABLET, EXTENDED RELEASE ORAL DAILY
Qty: 30 TABLET | Refills: 0 | Status: SHIPPED | OUTPATIENT
Start: 2022-10-19 | End: 2022-10-24 | Stop reason: SDUPTHER

## 2022-10-19 RX ORDER — RANOLAZINE 500 MG/1
500 TABLET, EXTENDED RELEASE ORAL EVERY 12 HOURS
Qty: 60 TABLET | Refills: 0 | Status: SHIPPED | OUTPATIENT
Start: 2022-10-19 | End: 2022-10-24 | Stop reason: SDUPTHER

## 2022-10-19 RX ORDER — CLOPIDOGREL BISULFATE 75 MG/1
75 TABLET ORAL DAILY
Qty: 30 TABLET | Refills: 0 | Status: SHIPPED | OUTPATIENT
Start: 2022-10-19 | End: 2022-10-24 | Stop reason: SDUPTHER

## 2022-10-24 ENCOUNTER — OFFICE VISIT (OUTPATIENT)
Dept: CARDIOLOGY | Facility: CLINIC | Age: 77
End: 2022-10-24

## 2022-10-24 VITALS
SYSTOLIC BLOOD PRESSURE: 138 MMHG | WEIGHT: 167 LBS | BODY MASS INDEX: 31.53 KG/M2 | HEART RATE: 72 BPM | HEIGHT: 61 IN | DIASTOLIC BLOOD PRESSURE: 76 MMHG

## 2022-10-24 DIAGNOSIS — E78.00 PURE HYPERCHOLESTEROLEMIA: ICD-10-CM

## 2022-10-24 DIAGNOSIS — Z95.5 HISTORY OF CORONARY ARTERY STENT PLACEMENT: ICD-10-CM

## 2022-10-24 DIAGNOSIS — I34.0 NON-RHEUMATIC MITRAL REGURGITATION: ICD-10-CM

## 2022-10-24 DIAGNOSIS — F17.210 CIGARETTE SMOKER: ICD-10-CM

## 2022-10-24 DIAGNOSIS — I51.9 LV DYSFUNCTION: ICD-10-CM

## 2022-10-24 DIAGNOSIS — I10 PRIMARY HYPERTENSION: ICD-10-CM

## 2022-10-24 DIAGNOSIS — I25.9 ISCHEMIC HEART DISEASE: Primary | ICD-10-CM

## 2022-10-24 PROCEDURE — 99214 OFFICE O/P EST MOD 30 MIN: CPT | Performed by: NURSE PRACTITIONER

## 2022-10-24 RX ORDER — LOSARTAN POTASSIUM AND HYDROCHLOROTHIAZIDE 25; 100 MG/1; MG/1
1 TABLET ORAL DAILY
Qty: 90 TABLET | Refills: 3 | Status: SHIPPED | OUTPATIENT
Start: 2022-10-24

## 2022-10-24 RX ORDER — METOPROLOL SUCCINATE 50 MG/1
50 TABLET, EXTENDED RELEASE ORAL DAILY
Qty: 90 TABLET | Refills: 3 | Status: SHIPPED | OUTPATIENT
Start: 2022-10-24

## 2022-10-24 RX ORDER — RANOLAZINE 500 MG/1
500 TABLET, EXTENDED RELEASE ORAL EVERY 12 HOURS
Qty: 180 TABLET | Refills: 3 | Status: SHIPPED | OUTPATIENT
Start: 2022-10-24

## 2022-10-24 RX ORDER — CLOPIDOGREL BISULFATE 75 MG/1
75 TABLET ORAL DAILY
Qty: 90 TABLET | Refills: 3 | Status: SHIPPED | OUTPATIENT
Start: 2022-10-24

## 2022-10-24 NOTE — PROGRESS NOTES
Chief Complaint   Patient presents with   • Follow-up     Cardiac management . Has no cardiac complaints today. Had Covid September 2022 and has a productive  cough.   • LABS     No current labs   • Med Refill     Needs refills on Hyzaar, Metoprolol , Ranexa and Plavix  90 day supply to [facility] pharmacy .       Subjective       Nicki Vela is a 77 y.o. female  with a history of ischemic heart disease first diagnosed many years ago with stenting. Unfortunately, she has continued to smoke. In 2015, cardiac testing showed only an infarct. Since the septum was still well perfused, medical management was encouraged.  At her January 2019 office visit lisinopril was stopped and Hyzaar started for better blood pressure control. She has liv developed significant skin rash for which she seen dermatologist and diagnosed with Palisaded granulomatous dermatitis.    Today she returns to the office for a follow-up visit.  She had not been seen in over a year but states she was doing well and was dealing with personal stress.  In September she was diagnosed and treated for COVID.  She continues to have some mild congestion but no fever and overall states she is feeling better.  Chest pain, palpitations, increase shortness of breath, dizziness, or issues with swelling noted.  She admits to good blood pressure control.  No recent change in cardiac medications noted.  She has not had recent labs but plans to follow-up with PCP in the near future.  Unfortunately she has not been able to stop smoking.    Cardiac History:    Past Surgical History:   Procedure Laterality Date   • CARDIOVASCULAR STRESS TEST  07/11/2007    Stress- 6 min, Inferior Infarct with Arlyn-Infarct Ischemia.   • CARDIOVASCULAR STRESS TEST  07/18/2011    Stress- 6 min, 76% THR. 134/80. Inferior Infarct with PeriInfarct Ischemia   • CARDIOVASCULAR STRESS TEST  12/16/2015    EF 51%, inferolateral wall infarct, LV function lower end normal, continue med management    • CATH LAB PROCEDURE  06/1999    Acute Inferior Wall MI. Cath and Stent of RCA   • CATH LAB PROCEDURE  11/13/2002    Cath- Mild In-Stent Stenosis.   • CHOLECYSTECTOMY     • ECHO - CONVERTED  06/27/2007    Echo- EF 44%. Inferoseptal WMA   • ECHO - CONVERTED  07/18/2011    Echo- EF>40%   • ECHO - CONVERTED  12/16/2015    EF 45-50%m mild MR, lateral WMA   • HYSTERECTOMY Bilateral     14 lb cyst       Current Outpatient Medications   Medication Sig Dispense Refill   • clopidogrel (PLAVIX) 75 MG tablet Take 1 tablet by mouth Daily. 90 tablet 3   • escitalopram (LEXAPRO) 20 MG tablet Take 20 mg by mouth daily.     • levothyroxine (SYNTHROID, LEVOTHROID) 112 MCG tablet Take 112 mcg by mouth daily.     • losartan-hydrochlorothiazide (HYZAAR) 100-25 MG per tablet Take 1 tablet by mouth Daily. 90 tablet 3   • metoprolol succinate XL (TOPROL-XL) 50 MG 24 hr tablet Take 1 tablet by mouth Daily. 90 tablet 3   • ranolazine (RANEXA) 500 MG 12 hr tablet Take 1 tablet by mouth Every 12 (Twelve) Hours. 180 tablet 3   • vitamin B-12 (CYANOCOBALAMIN) 500 MCG tablet Take 500 mcg by mouth Daily.     • vitamin E 400 UNIT capsule Take 400 Units by mouth Daily.       No current facility-administered medications for this visit.       Codeine and Tylenol with codeine #3 [acetaminophen-codeine]    Past Medical History:   Diagnosis Date   • HTN (hypertension)    • Hx of cholecystectomy    • Hypercholesteremia    • Hyperlipidemia    • IHD (ischemic heart disease)     s/p stenting   • S/P complete hysterectomy     14 lb. cyst       Social History     Socioeconomic History   • Marital status:    Tobacco Use   • Smoking status: Every Day     Packs/day: 0.50     Types: Cigarettes   • Smokeless tobacco: Never   • Tobacco comments:     patient counseled on effect's of tobacco use on health.    Vaping Use   • Vaping Use: Never used   Substance and Sexual Activity   • Alcohol use: No   • Drug use: No       Family History   Problem Relation Age  "of Onset   • Hypertension Mother    • Diabetes Mother    • Heart disease Father    • Diabetes Other    • Heart disease Other    • Hypertension Other        Review of Systems   Constitutional: Negative for decreased appetite, diaphoresis and malaise/fatigue.   HENT: Negative for nosebleeds.    Eyes: Negative for blurred vision.   Cardiovascular: Negative for chest pain, claudication, cyanosis, dyspnea on exertion, irregular heartbeat, leg swelling, near-syncope, orthopnea, palpitations, paroxysmal nocturnal dyspnea and syncope.   Respiratory: Positive for cough and wheezing. Negative for shortness of breath and sleep disturbances due to breathing (denies).    Endocrine: Negative for cold intolerance and heat intolerance.   Hematologic/Lymphatic: Negative for bleeding problem. Does not bruise/bleed easily.   Skin: Negative for rash.   Musculoskeletal: Negative for myalgias.   Gastrointestinal: Negative for heartburn, melena and nausea.   Genitourinary: Negative for dysuria and hematuria.   Neurological: Negative for dizziness and light-headedness.   Psychiatric/Behavioral: Negative for memory loss. The patient is not nervous/anxious.         BP Readings from Last 5 Encounters:   10/24/22 138/76   03/25/21 140/70   09/17/20 136/70   03/17/20 142/72   07/18/19 140/82       Wt Readings from Last 5 Encounters:   10/24/22 75.8 kg (167 lb)   03/25/21 70.5 kg (155 lb 6.4 oz)   09/17/20 73.4 kg (161 lb 12.8 oz)   03/17/20 74.2 kg (163 lb 9.6 oz)   07/18/19 72.9 kg (160 lb 12.8 oz)       Objective     /76 (BP Location: Left arm, Patient Position: Sitting)   Pulse 72   Ht 154.9 cm (60.98\")   Wt 75.8 kg (167 lb)   BMI 31.58 kg/m²     Vitals and nursing note reviewed.   Eyes:      Pupils: Pupils are equal, round, and reactive to light.   HENT:      Head: Normocephalic.   Pulmonary:      Effort: Pulmonary effort is normal.      Breath sounds: Rhonchi present.   Cardiovascular:      Normal rate. Regular rhythm.   Edema:   "   Ankle: bilateral trace edema of the ankle.  Abdominal:      General: Bowel sounds are normal.      Palpations: Abdomen is soft.   Musculoskeletal: Normal range of motion.      Cervical back: Normal range of motion. Skin:     General: Skin is warm.   Neurological:      Mental Status: Alert and oriented to person, place, and time.          Procedures: none today          Assessment & Plan   Diagnoses and all orders for this visit:    1. Ischemic heart disease (Primary)  -     clopidogrel (PLAVIX) 75 MG tablet; Take 1 tablet by mouth Daily.  Dispense: 90 tablet; Refill: 3  -     ranolazine (RANEXA) 500 MG 12 hr tablet; Take 1 tablet by mouth Every 12 (Twelve) Hours.  Dispense: 180 tablet; Refill: 3    2. History of coronary artery stent placement    3. LV dysfunction    4. Non-rheumatic mitral regurgitation    5. Primary hypertension  -     losartan-hydrochlorothiazide (HYZAAR) 100-25 MG per tablet; Take 1 tablet by mouth Daily.  Dispense: 90 tablet; Refill: 3  -     metoprolol succinate XL (TOPROL-XL) 50 MG 24 hr tablet; Take 1 tablet by mouth Daily.  Dispense: 90 tablet; Refill: 3    6. Pure hypercholesterolemia    7. Cigarette smoker       IHD/LV dysfunction/MR  -Stress test in 2018 was negative for ischemia.  Echocardiogram appeared showed mild decrease in LV function around 45%.  MR reported as mild.  -Patient denies cardiac symptoms or concerns therefore declines repeat cardiac work-up.  She agrees to call should she develop any symptoms of concern.  - Clinically cardiac murmur is unremarkable.  Blood pressure heart rate and rhythm are normal.  -Continue Plavix and Ranexa    Hypertension  -BP normal.  Continue current dose Toprol-XL and Hyzaar.  Refills faxed to her pharmacy.  Advised patient importance of maintaining routine labs to monitor kidney function and electrolytes.  Please forward copy of lab results and available.  -Dash diet encouraged.    Hypercholesterolemia  -Remains diet managed.  - Patient  plans to have repeat labs with you in the near future.  Please for copy results when available.    Patient admits to mild sinus congestion and rhonchi noted on auscultation.  Smoking cessation encouraged.  Patient does not feel she can stop smoking at this time.  She will further discuss with you respiratory management.    A 6-month follow-up visit scheduled.  Please call sooner for cardiac concerns.

## 2023-10-10 DIAGNOSIS — I25.9 ISCHEMIC HEART DISEASE: ICD-10-CM

## 2023-10-10 RX ORDER — CLOPIDOGREL BISULFATE 75 MG/1
TABLET ORAL
Qty: 240 TABLET | Refills: 0 | OUTPATIENT
Start: 2023-10-10

## 2023-11-09 DIAGNOSIS — I10 PRIMARY HYPERTENSION: ICD-10-CM

## 2023-11-09 DIAGNOSIS — I25.9 ISCHEMIC HEART DISEASE: ICD-10-CM

## 2023-11-09 NOTE — TELEPHONE ENCOUNTER
DELETE AFTER REVIEWING: Send the encounter HIGH priority, If patient has less than a 3 day supply. If the patient will run out of medication over the weekend add that information to the additional details line. Send this encounter to the clinical pool.    Caller: Nicki Vela    Relationship: Self    Best call back number: [phone]    Requested Prescriptions:   Requested Prescriptions     Pending Prescriptions Disp Refills    clopidogrel (PLAVIX) 75 MG tablet 90 tablet 3     Sig: Take 1 tablet by mouth Daily.    metoprolol succinate XL (TOPROL-XL) 50 MG 24 hr tablet 90 tablet 3     Sig: Take 1 tablet by mouth Daily.    ranolazine (RANEXA) 500 MG 12 hr tablet 180 tablet 3     Sig: Take 1 tablet by mouth Every 12 (Twelve) Hours.    losartan-hydrochlorothiazide (HYZAAR) 100-25 MG per tablet 90 tablet 3     Sig: Take 1 tablet by mouth Daily.        Pharmacy where request should be sent: [facility] [place] - [address] - [phone]  - [phone] FX     Last office visit with prescribing clinician: 10/24/2022   Last telemedicine visit with prescribing clinician: Visit date not found   Next office visit with prescribing clinician: Visit date not found     Additional details provided by patient: PATIENT WOULD LIKE HER SCRIPTS TO BE SENT IN 90 DAYS SUPPLY AND IS OUT OF PLAVIX MEDICATION AND OUT OF THE METOPROLOL ALSO    Does the patient have less than a 3 day supply:  [x] Yes  [] No    Would you like a call back once the refill request has been completed: [] Yes [] No    If the office needs to give you a call back, can they leave a voicemail: [] Yes [] No    Noe Whitehead Rep   11/09/23 16:02 EST

## 2023-11-10 RX ORDER — METOPROLOL SUCCINATE 50 MG/1
50 TABLET, EXTENDED RELEASE ORAL DAILY
Qty: 90 TABLET | Refills: 3 | Status: SHIPPED | OUTPATIENT
Start: 2023-11-10

## 2023-11-10 RX ORDER — CLOPIDOGREL BISULFATE 75 MG/1
75 TABLET ORAL DAILY
Qty: 90 TABLET | Refills: 3 | Status: SHIPPED | OUTPATIENT
Start: 2023-11-10

## 2023-11-10 RX ORDER — RANOLAZINE 500 MG/1
500 TABLET, EXTENDED RELEASE ORAL EVERY 12 HOURS
Qty: 180 TABLET | Refills: 3 | Status: SHIPPED | OUTPATIENT
Start: 2023-11-10

## 2023-11-10 RX ORDER — LOSARTAN POTASSIUM AND HYDROCHLOROTHIAZIDE 25; 100 MG/1; MG/1
1 TABLET ORAL DAILY
Qty: 90 TABLET | Refills: 3 | Status: SHIPPED | OUTPATIENT
Start: 2023-11-10

## 2024-02-06 ENCOUNTER — OFFICE VISIT (OUTPATIENT)
Dept: CARDIOLOGY | Facility: CLINIC | Age: 79
End: 2024-02-06
Payer: MEDICARE

## 2024-02-06 VITALS
SYSTOLIC BLOOD PRESSURE: 144 MMHG | WEIGHT: 166.2 LBS | DIASTOLIC BLOOD PRESSURE: 94 MMHG | BODY MASS INDEX: 31.38 KG/M2 | HEIGHT: 61 IN | HEART RATE: 74 BPM

## 2024-02-06 DIAGNOSIS — E78.00 PURE HYPERCHOLESTEROLEMIA: ICD-10-CM

## 2024-02-06 DIAGNOSIS — I10 PRIMARY HYPERTENSION: ICD-10-CM

## 2024-02-06 DIAGNOSIS — F17.210 CIGARETTE SMOKER: ICD-10-CM

## 2024-02-06 DIAGNOSIS — I25.9 ISCHEMIC HEART DISEASE: Primary | ICD-10-CM

## 2024-02-06 DIAGNOSIS — I51.9 LV DYSFUNCTION: ICD-10-CM

## 2024-02-06 DIAGNOSIS — Z95.5 HISTORY OF CORONARY ARTERY STENT PLACEMENT: ICD-10-CM

## 2024-02-06 PROCEDURE — 1160F RVW MEDS BY RX/DR IN RCRD: CPT | Performed by: NURSE PRACTITIONER

## 2024-02-06 PROCEDURE — 99214 OFFICE O/P EST MOD 30 MIN: CPT | Performed by: NURSE PRACTITIONER

## 2024-02-06 PROCEDURE — 3080F DIAST BP >= 90 MM HG: CPT | Performed by: NURSE PRACTITIONER

## 2024-02-06 PROCEDURE — 3077F SYST BP >= 140 MM HG: CPT | Performed by: NURSE PRACTITIONER

## 2024-02-06 PROCEDURE — 1159F MED LIST DOCD IN RCRD: CPT | Performed by: NURSE PRACTITIONER

## 2024-02-06 NOTE — PROGRESS NOTES
Chief Complaint   Patient presents with    Follow-up     Cardiac management    LABS     Has an order to have labs per PCP    Med Refill     No refills needed today. Patient would like to discuss Ranolazine dose. She feels is controlling chest pain but has noticed varicose veins with burning and stinging in legs at times       Subjective       Nicki Vela is a 78 y.o. female with IHD.  In 2015 stress test showed infarct, septum well perfused.  Stress test 2018 was negative for ischemia and echo showed EF around 45%.  She had issues with skin rash and diagnosed with Palisaded granulomatous dermatitis.     Today she returns to the office for follow-up visit.  She denies recent chest pain, increase shortness of breath, dizziness, edema, or palpitations.  She does have some tingling/stinging type sensation in her lower legs at times.  Claudication pain denied.  No leg symptoms interfering sleep noted.     Cardiac History:    Past Surgical History:   Procedure Laterality Date    CARDIOVASCULAR STRESS TEST  07/11/2007    Stress- 6 min, Inferior Infarct with Arlyn-Infarct Ischemia.    CARDIOVASCULAR STRESS TEST  07/18/2011    Stress- 6 min, 76% THR. 134/80. Inferior Infarct with PeriInfarct Ischemia    CARDIOVASCULAR STRESS TEST  12/16/2015    EF 51%, inferolateral wall infarct, LV function lower end normal, continue med management    CATH LAB PROCEDURE  06/1999    Acute Inferior Wall MI. Cath and Stent of RCA    CATH LAB PROCEDURE  11/13/2002    Cath- Mild In-Stent Stenosis.    CHOLECYSTECTOMY      ECHO - CONVERTED  06/27/2007    Echo- EF 44%. Inferoseptal WMA    ECHO - CONVERTED  07/18/2011    Echo- EF>40%    ECHO - CONVERTED  12/16/2015    EF 45-50%m mild MR, lateral WMA    HYSTERECTOMY Bilateral     14 lb cyst       Current Outpatient Medications   Medication Sig Dispense Refill    clopidogrel (PLAVIX) 75 MG tablet Take 1 tablet by mouth Daily. 90 tablet 3    escitalopram (LEXAPRO) 20 MG tablet Take 1 tablet by mouth  Daily.      levothyroxine (SYNTHROID, LEVOTHROID) 112 MCG tablet Take 1 tablet by mouth Daily.      metoprolol succinate XL (TOPROL-XL) 50 MG 24 hr tablet Take 1 tablet by mouth Daily. 90 tablet 3    ranolazine (RANEXA) 500 MG 12 hr tablet Take 1 tablet by mouth Every 12 (Twelve) Hours. 180 tablet 3    vitamin B-12 (CYANOCOBALAMIN) 500 MCG tablet Take 1 tablet by mouth Daily.      vitamin E 400 UNIT capsule Take 1 capsule by mouth Daily.      losartan-hydrochlorothiazide (HYZAAR) 100-25 MG per tablet Take 1 tablet by mouth Daily. 90 tablet 3     No current facility-administered medications for this visit.       Codeine and Tylenol with codeine #3 [acetaminophen-codeine]    Past Medical History:   Diagnosis Date    HTN (hypertension)     Hx of cholecystectomy     Hypercholesteremia     Hyperlipidemia     IHD (ischemic heart disease)     s/p stenting    S/P complete hysterectomy     14 lb. cyst       Social History     Socioeconomic History    Marital status:    Tobacco Use    Smoking status: Every Day     Packs/day: .5     Types: Cigarettes    Smokeless tobacco: Never    Tobacco comments:     patient counseled on effect's of tobacco use on health.    Vaping Use    Vaping Use: Never used   Substance and Sexual Activity    Alcohol use: No    Drug use: No       Family History   Problem Relation Age of Onset    Hypertension Mother     Diabetes Mother     Heart disease Father     Diabetes Other     Heart disease Other     Hypertension Other        Review of Systems   Constitutional: Negative for diaphoresis.   Cardiovascular:  Negative for chest pain, claudication, dyspnea on exertion, irregular heartbeat, leg swelling, near-syncope and palpitations.   Respiratory:  Positive for shortness of breath (With exertion, denies being a worsening symptom). Negative for sleep disturbances due to breathing.    Hematologic/Lymphatic: Negative for bleeding problem.   Musculoskeletal:  Negative for falls.   Gastrointestinal:   "Negative for nausea.   Neurological:  Positive for paresthesias. Negative for dizziness, headaches and weakness.   Psychiatric/Behavioral:  Negative for memory loss. The patient does not have insomnia and is not nervous/anxious.         BP Readings from Last 5 Encounters:   02/06/24 144/94   10/24/22 138/76   03/25/21 140/70   09/17/20 136/70   03/17/20 142/72       Wt Readings from Last 5 Encounters:   02/06/24 75.4 kg (166 lb 3.2 oz)   10/24/22 75.8 kg (167 lb)   03/25/21 70.5 kg (155 lb 6.4 oz)   09/17/20 73.4 kg (161 lb 12.8 oz)   03/17/20 74.2 kg (163 lb 9.6 oz)       Objective     /94 (BP Location: Right arm, Patient Position: Sitting)   Pulse 74   Ht 154.9 cm (60.98\")   Wt 75.4 kg (166 lb 3.2 oz)   BMI 31.42 kg/m²     Vitals and nursing note reviewed.   Constitutional:       Appearance: Well-groomed and not in distress.   Eyes:      Conjunctiva/sclera: Conjunctivae normal.   HENT:      Head: Normocephalic.   Pulmonary:      Effort: Pulmonary effort is normal.      Breath sounds: Wheezing present.   Cardiovascular:      PMI at left midclavicular line. Normal rate. Regular rhythm.      Murmurs: There is no murmur.   Pulses:     Intact distal pulses.   Edema:     Peripheral edema absent.   Abdominal:      General: Bowel sounds are normal.      Palpations: Abdomen is soft.   Musculoskeletal: Normal range of motion.      Cervical back: Normal range of motion and neck supple. Skin:     General: Skin is warm and dry.   Neurological:      Mental Status: Alert, oriented to person, place, and time and oriented to person, place and time.   Psychiatric:         Behavior: Behavior is cooperative.          Procedures: None today         Assessment & Plan   Diagnoses and all orders for this visit:    1. Ischemic heart disease (Primary)    2. History of coronary artery stent placement    3. LV dysfunction    4. Primary hypertension    5. Pure hypercholesterolemia    6. Cigarette smoker      IHD/stent " placement  -Stress test 2018: Negative for ischemia  -Denies anginal symptoms  -Continue Plavix, Ranexa    LV dysfunction/MR  -Echocardiogram 2018: LVEF 45%, mild MR    HTN  -Slightly elevated today  -BP monitoring form provided  -Heart rate and rhythm normal  -Continue Toprol XL 50 mg daily, Hyzaar 100-25 daily  -DASH diet  -If BP not at goal consider increase Toprol-XL to 75 mg daily    Hypercholesterolemia  -Diet managed  -Labs followed through your office    Hypothyroidism  -On Synthroid  -Managed through your office    Cigarette smoker  -Declines NRT      6-month follow-up visit scheduled.               Electronically signed by Irene Granados, JESSICA,  February 6, 2024 10:00 EST    Dictated Utilizing Dragon Dictation: Part of this note may be an electronic transcription/translation of spoken language to printed text using the Dragon Dictation System.

## 2024-08-02 DIAGNOSIS — I10 PRIMARY HYPERTENSION: ICD-10-CM

## 2024-08-02 DIAGNOSIS — I25.9 ISCHEMIC HEART DISEASE: ICD-10-CM

## 2024-08-07 ENCOUNTER — OFFICE VISIT (OUTPATIENT)
Dept: CARDIOLOGY | Facility: CLINIC | Age: 79
End: 2024-08-07
Payer: MEDICARE

## 2024-08-07 VITALS
WEIGHT: 162.4 LBS | SYSTOLIC BLOOD PRESSURE: 118 MMHG | DIASTOLIC BLOOD PRESSURE: 72 MMHG | HEIGHT: 61 IN | BODY MASS INDEX: 30.66 KG/M2 | HEART RATE: 70 BPM

## 2024-08-07 DIAGNOSIS — F17.210 CIGARETTE SMOKER: ICD-10-CM

## 2024-08-07 DIAGNOSIS — I25.9 ISCHEMIC HEART DISEASE: Primary | ICD-10-CM

## 2024-08-07 DIAGNOSIS — E78.00 PURE HYPERCHOLESTEROLEMIA: ICD-10-CM

## 2024-08-07 DIAGNOSIS — Z95.5 HISTORY OF CORONARY ARTERY STENT PLACEMENT: ICD-10-CM

## 2024-08-07 DIAGNOSIS — I10 PRIMARY HYPERTENSION: ICD-10-CM

## 2024-08-07 DIAGNOSIS — I51.9 LV DYSFUNCTION: ICD-10-CM

## 2024-08-07 NOTE — PROGRESS NOTES
"Chief Complaint   Patient presents with    Follow-up     Cardiac management    LABS     Has routine labs  per PCP.  Patient reports all labs were in normal range     Med Refill     No refills needed today       Subjective       Nicki Vela is a 79 y.o. female with IHD.  In 2015 stress test showed infarct, septum well perfused.  Stress test 2018 was negative for ischemia and echo showed EF around 45%.  She had issues with skin rash and diagnosed with Palisaded granulomatous dermatitis.     Today she returns to the office for a follow-up visit.  She denies cardiac symptoms or concerns.  Blood pressure has remained stable and no recent medication changes noted.  According to patient recent labs were \"good\".    Cardiac History:    Past Surgical History:   Procedure Laterality Date    CARDIOVASCULAR STRESS TEST  07/11/2007    Stress- 6 min, Inferior Infarct with Arlyn-Infarct Ischemia.    CARDIOVASCULAR STRESS TEST  07/18/2011    Stress- 6 min, 76% THR. 134/80. Inferior Infarct with PeriInfarct Ischemia    CARDIOVASCULAR STRESS TEST  12/16/2015    EF 51%, inferolateral wall infarct, LV function lower end normal, continue med management    CATH LAB PROCEDURE  06/1999    Acute Inferior Wall MI. Cath and Stent of RCA    CATH LAB PROCEDURE  11/13/2002    Cath- Mild In-Stent Stenosis.    CHOLECYSTECTOMY      ECHO - CONVERTED  06/27/2007    Echo- EF 44%. Inferoseptal WMA    ECHO - CONVERTED  07/18/2011    Echo- EF>40%    ECHO - CONVERTED  12/16/2015    EF 45-50%m mild MR, lateral WMA    HYSTERECTOMY Bilateral     14 lb cyst       Current Outpatient Medications   Medication Sig Dispense Refill    clopidogrel (PLAVIX) 75 MG tablet Take 1 tablet by mouth Daily. 90 tablet 3    escitalopram (LEXAPRO) 20 MG tablet Take 1 tablet by mouth Daily.      levothyroxine (SYNTHROID, LEVOTHROID) 112 MCG tablet Take 1 tablet by mouth Daily.      losartan-hydrochlorothiazide (HYZAAR) 100-25 MG per tablet Take 1 tablet by mouth Daily. 90 tablet 3 "    metoprolol succinate XL (TOPROL-XL) 50 MG 24 hr tablet Take 1 tablet by mouth Daily. 90 tablet 3    ranolazine (RANEXA) 500 MG 12 hr tablet Take 1 tablet by mouth Every 12 (Twelve) Hours. 180 tablet 3    vitamin B-12 (CYANOCOBALAMIN) 500 MCG tablet Take 1 tablet by mouth Daily.      vitamin E 400 UNIT capsule Take 1 capsule by mouth Daily.       No current facility-administered medications for this visit.       Codeine and Tylenol with codeine #3 [acetaminophen-codeine]    Past Medical History:   Diagnosis Date    HTN (hypertension)     Hx of cholecystectomy     Hypercholesteremia     Hyperlipidemia     IHD (ischemic heart disease)     s/p stenting    S/P complete hysterectomy     14 lb. cyst       Social History     Socioeconomic History    Marital status:    Tobacco Use    Smoking status: Every Day     Current packs/day: 0.50     Types: Cigarettes    Smokeless tobacco: Never    Tobacco comments:     patient counseled on effect's of tobacco use on health.    Vaping Use    Vaping status: Never Used   Substance and Sexual Activity    Alcohol use: No    Drug use: No       Family History   Problem Relation Age of Onset    Hypertension Mother     Diabetes Mother     Heart disease Father     Diabetes Other     Heart disease Other     Hypertension Other        Review of Systems   Cardiovascular:  Negative for chest pain, leg swelling, near-syncope and palpitations.   Respiratory:  Positive for shortness of breath (Works in hot weather, otherwise same).    Hematologic/Lymphatic: Negative for bleeding problem.   Neurological:  Negative for dizziness, loss of balance and numbness.   Psychiatric/Behavioral:  Negative for memory loss. The patient does not have insomnia.         BP Readings from Last 5 Encounters:   08/07/24 118/72   02/06/24 144/94   10/24/22 138/76   03/25/21 140/70   09/17/20 136/70       Wt Readings from Last 5 Encounters:   08/07/24 73.7 kg (162 lb 6.4 oz)   02/06/24 75.4 kg (166 lb 3.2 oz)  "  10/24/22 75.8 kg (167 lb)   03/25/21 70.5 kg (155 lb 6.4 oz)   09/17/20 73.4 kg (161 lb 12.8 oz)       Objective     /72 (BP Location: Left arm, Patient Position: Sitting)   Pulse 70   Ht 154.9 cm (60.98\")   Wt 73.7 kg (162 lb 6.4 oz)   BMI 30.71 kg/m²     Vitals and nursing note reviewed.   Constitutional:       Appearance: Well-groomed and not in distress.   Eyes:      Conjunctiva/sclera: Conjunctivae normal.      Pupils: Pupils are equal, round, and reactive to light.   HENT:      Head: Normocephalic.   Neck:      Vascular: No carotid bruit.   Pulmonary:      Effort: Pulmonary effort is normal.      Breath sounds: Normal breath sounds.   Cardiovascular:      PMI at left midclavicular line. Normal rate. Regular rhythm.      Murmurs: There is no murmur.   Edema:     Peripheral edema absent.   Abdominal:      General: Bowel sounds are normal.      Palpations: Abdomen is soft.   Musculoskeletal: Normal range of motion.      Cervical back: Normal range of motion and neck supple. Skin:     General: Skin is warm and dry.   Neurological:      Mental Status: Alert, oriented to person, place, and time and oriented to person, place and time.   Psychiatric:         Behavior: Behavior is cooperative.          Procedures: None today         Assessment & Plan   Diagnoses and all orders for this visit:    1. Ischemic heart disease (Primary)    2. History of coronary artery stent placement    3. LV dysfunction    4. Primary hypertension    5. Pure hypercholesterolemia    6. Cigarette smoker      IHD/stent placement  -Stress test 2018: Negative for ischemia  -Denies anginal symptoms  -Continue Plavix, Ranexa     LV dysfunction/MR  -Echocardiogram 2018: LVEF 45%, mild MR     HTN  - BP normal  -Heart rate and rhythm normal  -Continue Toprol XL 50 mg daily, Hyzaar 100-25 daily  -DASH diet     Hypercholesterolemia  -Diet managed  -Labs followed through your office     Hypothyroidism  -On Synthroid  -Managed through your " office     Cigarette smoker  -Declines NRT      6-month follow-up visit scheduled.             Electronically signed by JESSICA Keita,  August 7, 2024 13:42 EDT    Dictated Utilizing Dragon Dictation: Part of this note may be an electronic transcription/translation of spoken language to printed text using the Dragon Dictation System.

## 2024-10-29 RX ORDER — RANOLAZINE 500 MG/1
TABLET, EXTENDED RELEASE ORAL
Qty: 180 TABLET | Refills: 0 | Status: SHIPPED | OUTPATIENT
Start: 2024-10-29

## 2024-10-29 RX ORDER — CLOPIDOGREL BISULFATE 75 MG/1
TABLET ORAL
Qty: 90 TABLET | Refills: 0 | Status: SHIPPED | OUTPATIENT
Start: 2024-10-29

## 2024-10-29 RX ORDER — LOSARTAN POTASSIUM AND HYDROCHLOROTHIAZIDE 25; 100 MG/1; MG/1
TABLET ORAL
Qty: 90 TABLET | Refills: 0 | Status: SHIPPED | OUTPATIENT
Start: 2024-10-29

## 2024-10-29 RX ORDER — METOPROLOL SUCCINATE 50 MG/1
TABLET, EXTENDED RELEASE ORAL
Qty: 90 TABLET | Refills: 0 | Status: SHIPPED | OUTPATIENT
Start: 2024-10-29

## 2024-10-29 NOTE — TELEPHONE ENCOUNTER
Rx Refill Note  Requested Prescriptions     Pending Prescriptions Disp Refills    losartan-hydrochlorothiazide (HYZAAR) 100-25 MG per tablet [Pharmacy Med Name: LOSARTAN-HYDROCHLOROTHIAZIDE 100-25 MG TAB] 90 tablet 0     Sig: TAKE 1 TABLET BY MOUTH EVERY DAY TO LOWER BLOOD PRESSURE    ranolazine (RANEXA) 500 MG 12 hr tablet [Pharmacy Med Name: RANOLAZINE  MG TABLET] 180 tablet 0     Sig: TAKE 1 TABLET BY MOUTH EVERY 12 HOURS for angina    clopidogrel (PLAVIX) 75 MG tablet [Pharmacy Med Name: CLOPIDOGREL 75 MG TABLET] 90 tablet 0     Sig: TAKE 1 TABLET BY MOUTH EVERY DAY to prevent clotting    metoprolol succinate XL (TOPROL-XL) 50 MG 24 hr tablet [Pharmacy Med Name: METOPROLOL SUCC ER 50 MG TAB] 90 tablet 0     Sig: TAKE 1 TABLET BY MOUTH EVERY DAY TO LOWER BLOOD PRESSURE and/or for heart      Last office visit with prescribing clinician: 2/6/2024   Last telemedicine visit with prescribing clinician: Visit date not found   Next office visit with prescribing clinician: 8/7/2024                         Would you like a call back once the refill request has been completed: [] Yes [] No    If the office needs to give you a call back, can they leave a voicemail: [] Yes [] No    Sharonda Espinosa CMA  10/29/24, 09:21 EDT

## 2024-10-31 DIAGNOSIS — I10 PRIMARY HYPERTENSION: ICD-10-CM

## 2024-10-31 DIAGNOSIS — I25.9 ISCHEMIC HEART DISEASE: ICD-10-CM

## 2025-01-30 RX ORDER — RANOLAZINE 500 MG/1
TABLET, EXTENDED RELEASE ORAL
Qty: 180 TABLET | Refills: 0 | Status: SHIPPED | OUTPATIENT
Start: 2025-01-30

## 2025-01-30 RX ORDER — LOSARTAN POTASSIUM AND HYDROCHLOROTHIAZIDE 25; 100 MG/1; MG/1
TABLET ORAL
Qty: 90 TABLET | Refills: 0 | Status: SHIPPED | OUTPATIENT
Start: 2025-01-30

## 2025-01-30 RX ORDER — CLOPIDOGREL BISULFATE 75 MG/1
TABLET ORAL
Qty: 90 TABLET | Refills: 0 | Status: SHIPPED | OUTPATIENT
Start: 2025-01-30

## 2025-01-30 RX ORDER — METOPROLOL SUCCINATE 50 MG/1
TABLET, EXTENDED RELEASE ORAL
Qty: 90 TABLET | Refills: 0 | Status: SHIPPED | OUTPATIENT
Start: 2025-01-30

## 2025-01-30 NOTE — TELEPHONE ENCOUNTER
Patient left message requesting refills on cardiac medications to [facility].  Patient has refill request pending on 10/31/24 encounter.  Refills sent on 10/31/24 encounter to [facility].

## 2025-01-31 DIAGNOSIS — I10 PRIMARY HYPERTENSION: ICD-10-CM

## 2025-01-31 DIAGNOSIS — I25.9 ISCHEMIC HEART DISEASE: ICD-10-CM

## 2025-01-31 RX ORDER — CLOPIDOGREL BISULFATE 75 MG/1
TABLET ORAL
Qty: 90 TABLET | Refills: 0 | OUTPATIENT
Start: 2025-01-31

## 2025-01-31 RX ORDER — LOSARTAN POTASSIUM AND HYDROCHLOROTHIAZIDE 25; 100 MG/1; MG/1
TABLET ORAL
Qty: 90 TABLET | Refills: 0 | OUTPATIENT
Start: 2025-01-31

## 2025-01-31 RX ORDER — METOPROLOL SUCCINATE 50 MG/1
TABLET, EXTENDED RELEASE ORAL
Qty: 90 TABLET | Refills: 0 | OUTPATIENT
Start: 2025-01-31

## 2025-03-03 DIAGNOSIS — I25.9 ISCHEMIC HEART DISEASE: ICD-10-CM

## 2025-03-03 RX ORDER — RANOLAZINE 500 MG/1
TABLET, EXTENDED RELEASE ORAL
Qty: 180 TABLET | Refills: 0 | OUTPATIENT
Start: 2025-03-03

## 2025-03-19 ENCOUNTER — OFFICE VISIT (OUTPATIENT)
Dept: CARDIOLOGY | Facility: CLINIC | Age: 80
End: 2025-03-19
Payer: MEDICARE

## 2025-03-19 VITALS
SYSTOLIC BLOOD PRESSURE: 140 MMHG | DIASTOLIC BLOOD PRESSURE: 70 MMHG | HEART RATE: 76 BPM | BODY MASS INDEX: 29.83 KG/M2 | HEIGHT: 61 IN | WEIGHT: 158 LBS

## 2025-03-19 DIAGNOSIS — E78.00 PURE HYPERCHOLESTEROLEMIA: ICD-10-CM

## 2025-03-19 DIAGNOSIS — Z95.5 HISTORY OF CORONARY ARTERY STENT PLACEMENT: ICD-10-CM

## 2025-03-19 DIAGNOSIS — I25.9 ISCHEMIC HEART DISEASE: Primary | ICD-10-CM

## 2025-03-19 DIAGNOSIS — I10 PRIMARY HYPERTENSION: ICD-10-CM

## 2025-03-19 DIAGNOSIS — E55.9 VITAMIN D DEFICIENCY: ICD-10-CM

## 2025-03-19 DIAGNOSIS — E03.9 HYPOTHYROIDISM (ACQUIRED): ICD-10-CM

## 2025-03-19 PROCEDURE — 99214 OFFICE O/P EST MOD 30 MIN: CPT | Performed by: NURSE PRACTITIONER

## 2025-03-19 PROCEDURE — 3077F SYST BP >= 140 MM HG: CPT | Performed by: NURSE PRACTITIONER

## 2025-03-19 PROCEDURE — 3078F DIAST BP <80 MM HG: CPT | Performed by: NURSE PRACTITIONER

## 2025-03-19 PROCEDURE — 1160F RVW MEDS BY RX/DR IN RCRD: CPT | Performed by: NURSE PRACTITIONER

## 2025-03-19 PROCEDURE — 1159F MED LIST DOCD IN RCRD: CPT | Performed by: NURSE PRACTITIONER

## 2025-03-19 RX ORDER — METOPROLOL SUCCINATE 50 MG/1
50 TABLET, EXTENDED RELEASE ORAL DAILY
Qty: 90 TABLET | Refills: 3 | Status: SHIPPED | OUTPATIENT
Start: 2025-03-19

## 2025-03-19 RX ORDER — RANOLAZINE 500 MG/1
500 TABLET, EXTENDED RELEASE ORAL 2 TIMES DAILY
Qty: 180 TABLET | Refills: 3 | Status: SHIPPED | OUTPATIENT
Start: 2025-03-19

## 2025-03-19 RX ORDER — CLOPIDOGREL BISULFATE 75 MG/1
75 TABLET ORAL DAILY
Qty: 90 TABLET | Refills: 3 | Status: SHIPPED | OUTPATIENT
Start: 2025-03-19

## 2025-03-19 RX ORDER — LOSARTAN POTASSIUM AND HYDROCHLOROTHIAZIDE 25; 100 MG/1; MG/1
1 TABLET ORAL DAILY
Qty: 90 TABLET | Refills: 3 | Status: SHIPPED | OUTPATIENT
Start: 2025-03-19

## 2025-03-19 NOTE — PROGRESS NOTES
Chief Complaint   Patient presents with    Follow-up     Cardiac management    LABS     No current labs     Med Refill     Needs refills on  metoprolol, clopidogrel, Hyzaar and ranexa 90 day supply to [facility] .       Subjective       Nicki Vela is a 79 y.o. female with IHD and stent placement. In 2015 stress test showed infarct, septum well perfused. Stress test 2018 was negative for ischemia and echo showed EF around 45%. She had issues with skin rash and diagnosed with Palisaded granulomatous dermatitis.     Today she returns to the office for follow-up visit.  No cardiac symptoms or concerns voiced.  No recent change in cardiac medication management noted.  She admits she has not had recent labs.    Cardiac History:    Past Surgical History:   Procedure Laterality Date    CARDIOVASCULAR STRESS TEST  07/11/2007    Stress- 6 min, Inferior Infarct with Arlyn-Infarct Ischemia.    CARDIOVASCULAR STRESS TEST  07/18/2011    Stress- 6 min, 76% THR. 134/80. Inferior Infarct with PeriInfarct Ischemia    CARDIOVASCULAR STRESS TEST  12/16/2015    EF 51%, inferolateral wall infarct, LV function lower end normal, continue med management    CATH LAB PROCEDURE  06/1999    Acute Inferior Wall MI. Cath and Stent of RCA    CATH LAB PROCEDURE  11/13/2002    Cath- Mild In-Stent Stenosis.    CHOLECYSTECTOMY      ECHO - CONVERTED  06/27/2007    Echo- EF 44%. Inferoseptal WMA    ECHO - CONVERTED  07/18/2011    Echo- EF>40%    ECHO - CONVERTED  12/16/2015    EF 45-50%m mild MR, lateral WMA    HYSTERECTOMY Bilateral     14 lb cyst       Current Outpatient Medications   Medication Sig Dispense Refill    clopidogrel (PLAVIX) 75 MG tablet Take 1 tablet by mouth Daily. 90 tablet 3    escitalopram (LEXAPRO) 20 MG tablet Take 1 tablet by mouth Daily.      levothyroxine (SYNTHROID, LEVOTHROID) 112 MCG tablet Take 1 tablet by mouth Daily.      losartan-hydrochlorothiazide (HYZAAR) 100-25 MG per tablet Take 1 tablet by mouth Daily. 90 tablet 3     metoprolol succinate XL (TOPROL-XL) 50 MG 24 hr tablet Take 1 tablet by mouth Daily. 90 tablet 3    ranolazine (RANEXA) 500 MG 12 hr tablet Take 1 tablet by mouth 2 (Two) Times a Day. 180 tablet 3    vitamin B-12 (CYANOCOBALAMIN) 500 MCG tablet Take 1 tablet by mouth Daily.      vitamin E 400 UNIT capsule Take 1 capsule by mouth Daily.       No current facility-administered medications for this visit.       Codeine and Tylenol with codeine #3 [acetaminophen-codeine]    Past Medical History:   Diagnosis Date    HTN (hypertension)     Hx of cholecystectomy     Hypercholesteremia     Hyperlipidemia     IHD (ischemic heart disease)     s/p stenting    S/P complete hysterectomy     14 lb. cyst       Social History     Socioeconomic History    Marital status:    Tobacco Use    Smoking status: Every Day     Current packs/day: 0.50     Types: Cigarettes    Smokeless tobacco: Never    Tobacco comments:     patient counseled on effect's of tobacco use on health.    Vaping Use    Vaping status: Never Used   Substance and Sexual Activity    Alcohol use: No    Drug use: No       Family History   Problem Relation Age of Onset    Hypertension Mother     Diabetes Mother     Heart disease Father     Diabetes Other     Heart disease Other     Hypertension Other        Review of Systems   Cardiovascular:  Negative for chest pain, dyspnea on exertion, leg swelling and palpitations.   Endocrine: Negative for polydipsia, polyphagia and polyuria.   Hematologic/Lymphatic: Negative for bleeding problem.   Neurological:  Negative for dizziness, headaches and loss of balance.        BP Readings from Last 5 Encounters:   03/19/25 140/70   08/07/24 118/72   02/06/24 144/94   10/24/22 138/76   03/25/21 140/70       Wt Readings from Last 5 Encounters:   03/19/25 71.7 kg (158 lb)   08/07/24 73.7 kg (162 lb 6.4 oz)   02/06/24 75.4 kg (166 lb 3.2 oz)   10/24/22 75.8 kg (167 lb)   03/25/21 70.5 kg (155 lb 6.4 oz)       Objective     BP  "140/70 (BP Location: Left arm, Patient Position: Sitting, Cuff Size: Adult)   Pulse 76   Ht 154.9 cm (60.98\")   Wt 71.7 kg (158 lb)   BMI 29.87 kg/m²     Vitals and nursing note reviewed.   Constitutional:       Appearance: Healthy appearance. Not in distress.   Eyes:      Conjunctiva/sclera: Conjunctivae normal.      Pupils: Pupils are equal, round, and reactive to light.   HENT:      Head: Normocephalic.   Pulmonary:      Effort: Pulmonary effort is normal.      Breath sounds: Normal breath sounds.   Cardiovascular:      PMI at left midclavicular line. Normal rate. Regular rhythm.   Edema:     Peripheral edema absent.   Abdominal:      General: Bowel sounds are normal.      Palpations: Abdomen is soft.   Musculoskeletal: Normal range of motion.      Cervical back: Normal range of motion and neck supple. Skin:     General: Skin is warm and dry.   Neurological:      Mental Status: Alert, oriented to person, place, and time and oriented to person, place and time.          Procedures: None today         Assessment & Plan   Diagnoses and all orders for this visit:    1. Ischemic heart disease (Primary)  -     Lipid Panel; Future  -     ranolazine (RANEXA) 500 MG 12 hr tablet; Take 1 tablet by mouth 2 (Two) Times a Day.  Dispense: 180 tablet; Refill: 3  -     clopidogrel (PLAVIX) 75 MG tablet; Take 1 tablet by mouth Daily.  Dispense: 90 tablet; Refill: 3    2. History of coronary artery stent placement    3. Pure hypercholesterolemia  -     Lipid Panel; Future    4. Primary hypertension  -     Comprehensive Metabolic Panel; Future  -     CBC & Differential; Future  -     metoprolol succinate XL (TOPROL-XL) 50 MG 24 hr tablet; Take 1 tablet by mouth Daily.  Dispense: 90 tablet; Refill: 3  -     losartan-hydrochlorothiazide (HYZAAR) 100-25 MG per tablet; Take 1 tablet by mouth Daily.  Dispense: 90 tablet; Refill: 3    5. Vitamin D deficiency  -     Calcitriol (1,25 di-OH Vitamin D); Future    6. Hypothyroidism " (acquired)  -     TSH; Future      IHD/stent placement  -Stress test 2018: Negative for ischemia  -Denies anginal symptoms  -Continue Plavix, Ranexa     LV dysfunction/MR  -Echocardiogram 2018: LVEF 45%, mild MR     HTN  - BP normal  -Heart rate and rhythm normal  -Continue Toprol XL 50 mg daily, Hyzaar 100-25 daily  -DASH diet     Hypercholesterolemia  -Diet managed  -Lab order given to include lipid panel.     Hypothyroidism  -On Synthroid  -TSH requested    Will plan to forward lab results once available.     Cigarette smoker  -Declines NRT      Due to remaining asymptomatic patient declines cardiac testing.  6-month follow-up visit scheduled.                 Electronically signed by JESSICA Keita,  March 19, 2025 15:09 EDT    Dictated Utilizing Dragon Dictation: Part of this note may be an electronic transcription/translation of spoken language to printed text using the Dragon Dictation System.

## 2025-04-07 DIAGNOSIS — I25.9 ISCHEMIC HEART DISEASE: ICD-10-CM

## 2025-04-10 RX ORDER — RANOLAZINE 500 MG/1
TABLET, EXTENDED RELEASE ORAL
Qty: 180 TABLET | Refills: 0 | OUTPATIENT
Start: 2025-04-10